# Patient Record
Sex: FEMALE | NOT HISPANIC OR LATINO | Employment: UNEMPLOYED | URBAN - METROPOLITAN AREA
[De-identification: names, ages, dates, MRNs, and addresses within clinical notes are randomized per-mention and may not be internally consistent; named-entity substitution may affect disease eponyms.]

---

## 2017-01-01 ENCOUNTER — COMMUNICATION - HEALTHEAST (OUTPATIENT)
Dept: FAMILY MEDICINE | Facility: CLINIC | Age: 57
End: 2017-01-01

## 2017-01-01 DIAGNOSIS — R73.09 OTHER ABNORMAL GLUCOSE: ICD-10-CM

## 2017-04-06 ENCOUNTER — COMMUNICATION - HEALTHEAST (OUTPATIENT)
Dept: FAMILY MEDICINE | Facility: CLINIC | Age: 57
End: 2017-04-06

## 2017-04-06 DIAGNOSIS — R73.09 OTHER ABNORMAL GLUCOSE: ICD-10-CM

## 2017-04-28 ENCOUNTER — COMMUNICATION - HEALTHEAST (OUTPATIENT)
Dept: FAMILY MEDICINE | Facility: CLINIC | Age: 57
End: 2017-04-28

## 2017-05-02 ENCOUNTER — COMMUNICATION - HEALTHEAST (OUTPATIENT)
Dept: FAMILY MEDICINE | Facility: CLINIC | Age: 57
End: 2017-05-02

## 2017-06-27 ENCOUNTER — COMMUNICATION - HEALTHEAST (OUTPATIENT)
Dept: FAMILY MEDICINE | Facility: CLINIC | Age: 57
End: 2017-06-27

## 2017-09-10 ENCOUNTER — COMMUNICATION - HEALTHEAST (OUTPATIENT)
Dept: FAMILY MEDICINE | Facility: CLINIC | Age: 57
End: 2017-09-10

## 2017-09-10 DIAGNOSIS — K21.9 GERD (GASTROESOPHAGEAL REFLUX DISEASE): ICD-10-CM

## 2017-09-17 ENCOUNTER — COMMUNICATION - HEALTHEAST (OUTPATIENT)
Dept: FAMILY MEDICINE | Facility: CLINIC | Age: 57
End: 2017-09-17

## 2017-10-04 ENCOUNTER — COMMUNICATION - HEALTHEAST (OUTPATIENT)
Dept: FAMILY MEDICINE | Facility: CLINIC | Age: 57
End: 2017-10-04

## 2017-10-04 DIAGNOSIS — R73.09 OTHER ABNORMAL GLUCOSE: ICD-10-CM

## 2017-11-20 ENCOUNTER — COMMUNICATION - HEALTHEAST (OUTPATIENT)
Dept: FAMILY MEDICINE | Facility: CLINIC | Age: 57
End: 2017-11-20

## 2017-11-20 DIAGNOSIS — R73.9 HYPERGLYCEMIA: ICD-10-CM

## 2017-11-21 ENCOUNTER — COMMUNICATION - HEALTHEAST (OUTPATIENT)
Dept: FAMILY MEDICINE | Facility: CLINIC | Age: 57
End: 2017-11-21

## 2017-11-22 ENCOUNTER — COMMUNICATION - HEALTHEAST (OUTPATIENT)
Dept: FAMILY MEDICINE | Facility: CLINIC | Age: 57
End: 2017-11-22

## 2017-11-22 DIAGNOSIS — E78.5 HLD (HYPERLIPIDEMIA): ICD-10-CM

## 2017-12-09 ENCOUNTER — COMMUNICATION - HEALTHEAST (OUTPATIENT)
Dept: FAMILY MEDICINE | Facility: CLINIC | Age: 57
End: 2017-12-09

## 2017-12-09 DIAGNOSIS — K21.9 GERD (GASTROESOPHAGEAL REFLUX DISEASE): ICD-10-CM

## 2017-12-13 ENCOUNTER — COMMUNICATION - HEALTHEAST (OUTPATIENT)
Dept: FAMILY MEDICINE | Facility: CLINIC | Age: 57
End: 2017-12-13

## 2017-12-13 DIAGNOSIS — E78.2 MIXED HYPERLIPIDEMIA: ICD-10-CM

## 2017-12-19 ENCOUNTER — OFFICE VISIT - HEALTHEAST (OUTPATIENT)
Dept: FAMILY MEDICINE | Facility: CLINIC | Age: 57
End: 2017-12-19

## 2017-12-19 DIAGNOSIS — Z12.31 ENCOUNTER FOR SCREENING MAMMOGRAM FOR BREAST CANCER: ICD-10-CM

## 2017-12-19 DIAGNOSIS — K21.9 ESOPHAGEAL REFLUX: ICD-10-CM

## 2017-12-19 DIAGNOSIS — E78.2 MIXED HYPERLIPIDEMIA: ICD-10-CM

## 2017-12-19 DIAGNOSIS — E55.9 VITAMIN D DEFICIENCY: ICD-10-CM

## 2017-12-19 DIAGNOSIS — E11.9 TYPE 2 DIABETES MELLITUS WITHOUT COMPLICATION (H): ICD-10-CM

## 2017-12-19 DIAGNOSIS — Z00.00 HEALTH CARE MAINTENANCE: ICD-10-CM

## 2017-12-19 DIAGNOSIS — R42 DIZZINESS AND GIDDINESS: ICD-10-CM

## 2017-12-19 LAB
CHOLEST SERPL-MCNC: 204 MG/DL
FASTING STATUS PATIENT QL REPORTED: YES
HDLC SERPL-MCNC: 48 MG/DL
LDLC SERPL CALC-MCNC: 133 MG/DL
TRIGL SERPL-MCNC: 113 MG/DL

## 2017-12-21 LAB
HUMAN PAPILLOMA VIRUS 16 DNA: NEGATIVE
HUMAN PAPILLOMA VIRUS 18 DNA: POSITIVE
HUMAN PAPILLOMA VIRUS FINAL DIAGNOSIS: ABNORMAL
HUMAN PAPILLOMA VIRUS OTHER HR: NEGATIVE
SPECIMEN DESCRIPTION: ABNORMAL

## 2018-01-02 LAB
BKR LAB AP ABNORMAL BLEEDING: NO
BKR LAB AP BIRTH CONTROL/HORMONES: ABNORMAL
BKR LAB AP CERVICAL APPEARANCE: NORMAL
BKR LAB AP GYN ADEQUACY: ABNORMAL
BKR LAB AP GYN INTERPRETATION: ABNORMAL
BKR LAB AP HPV REFLEX: ABNORMAL
BKR LAB AP LMP: ABNORMAL
BKR LAB AP PATIENT STATUS: ABNORMAL
BKR LAB AP PREVIOUS ABNORMAL: NO
BKR LAB AP PREVIOUS NORMAL: 2012
HIGH RISK?: NO
PATH REPORT.COMMENTS IMP SPEC: ABNORMAL
RESULT FLAG (HE HISTORICAL CONVERSION): ABNORMAL

## 2018-01-11 ENCOUNTER — RECORDS - HEALTHEAST (OUTPATIENT)
Dept: ADMINISTRATIVE | Facility: OTHER | Age: 58
End: 2018-01-11

## 2018-01-11 ENCOUNTER — HOSPITAL ENCOUNTER (OUTPATIENT)
Dept: MAMMOGRAPHY | Facility: CLINIC | Age: 58
Discharge: HOME | End: 2018-01-11
Attending: FAMILY MEDICINE

## 2018-01-11 ENCOUNTER — RECORDS - HEALTHEAST (OUTPATIENT)
Dept: BONE DENSITY | Facility: CLINIC | Age: 58
End: 2018-01-11

## 2018-01-11 DIAGNOSIS — E55.9 VITAMIN D DEFICIENCY, UNSPECIFIED: ICD-10-CM

## 2018-01-11 DIAGNOSIS — Z12.31 ENCOUNTER FOR SCREENING MAMMOGRAM FOR BREAST CANCER: ICD-10-CM

## 2018-02-19 ENCOUNTER — COMMUNICATION - HEALTHEAST (OUTPATIENT)
Dept: FAMILY MEDICINE | Facility: CLINIC | Age: 58
End: 2018-02-19

## 2018-02-19 DIAGNOSIS — E78.5 HLD (HYPERLIPIDEMIA): ICD-10-CM

## 2018-02-20 ENCOUNTER — AMBULATORY - HEALTHEAST (OUTPATIENT)
Dept: FAMILY MEDICINE | Facility: CLINIC | Age: 58
End: 2018-02-20

## 2018-02-20 DIAGNOSIS — R87.619 ABNORMAL PAP SMEAR OF CERVIX: ICD-10-CM

## 2018-02-20 DIAGNOSIS — E11.9 TYPE 2 DIABETES MELLITUS WITHOUT COMPLICATION (H): ICD-10-CM

## 2018-02-20 LAB
CREAT UR-MCNC: 141.3 MG/DL
HBA1C MFR BLD: 6.8 % (ref 3.5–6)
MICROALBUMIN UR-MCNC: 0.7 MG/DL (ref 0–1.99)
MICROALBUMIN/CREAT UR: 5 MG/G

## 2018-02-23 LAB
LAB AP CHARGES (HE HISTORICAL CONVERSION): NORMAL
PATH REPORT.COMMENTS IMP SPEC: NORMAL
PATH REPORT.COMMENTS IMP SPEC: NORMAL
PATH REPORT.FINAL DX SPEC: NORMAL
PATH REPORT.GROSS SPEC: NORMAL
PATH REPORT.MICROSCOPIC SPEC OTHER STN: NORMAL
PATH REPORT.RELEVANT HX SPEC: NORMAL
RESULT FLAG (HE HISTORICAL CONVERSION): NORMAL

## 2018-03-07 ENCOUNTER — COMMUNICATION - HEALTHEAST (OUTPATIENT)
Dept: FAMILY MEDICINE | Facility: CLINIC | Age: 58
End: 2018-03-07

## 2018-03-07 DIAGNOSIS — R73.09 OTHER ABNORMAL GLUCOSE: ICD-10-CM

## 2018-03-10 ENCOUNTER — COMMUNICATION - HEALTHEAST (OUTPATIENT)
Dept: FAMILY MEDICINE | Facility: CLINIC | Age: 58
End: 2018-03-10

## 2018-03-10 DIAGNOSIS — E78.2 MIXED HYPERLIPIDEMIA: ICD-10-CM

## 2018-03-10 DIAGNOSIS — K21.9 GERD (GASTROESOPHAGEAL REFLUX DISEASE): ICD-10-CM

## 2018-03-26 ENCOUNTER — COMMUNICATION - HEALTHEAST (OUTPATIENT)
Dept: FAMILY MEDICINE | Facility: CLINIC | Age: 58
End: 2018-03-26

## 2018-04-10 ENCOUNTER — COMMUNICATION - HEALTHEAST (OUTPATIENT)
Dept: FAMILY MEDICINE | Facility: CLINIC | Age: 58
End: 2018-04-10

## 2018-04-10 ENCOUNTER — AMBULATORY - HEALTHEAST (OUTPATIENT)
Dept: FAMILY MEDICINE | Facility: CLINIC | Age: 58
End: 2018-04-10

## 2018-05-22 ENCOUNTER — COMMUNICATION - HEALTHEAST (OUTPATIENT)
Dept: FAMILY MEDICINE | Facility: CLINIC | Age: 58
End: 2018-05-22

## 2018-05-22 DIAGNOSIS — K21.9 GERD (GASTROESOPHAGEAL REFLUX DISEASE): ICD-10-CM

## 2018-05-22 DIAGNOSIS — E78.2 MIXED HYPERLIPIDEMIA: ICD-10-CM

## 2018-05-28 ENCOUNTER — COMMUNICATION - HEALTHEAST (OUTPATIENT)
Dept: FAMILY MEDICINE | Facility: CLINIC | Age: 58
End: 2018-05-28

## 2018-05-28 ENCOUNTER — RECORDS - HEALTHEAST (OUTPATIENT)
Dept: ADMINISTRATIVE | Facility: OTHER | Age: 58
End: 2018-05-28

## 2018-06-08 ENCOUNTER — COMMUNICATION - HEALTHEAST (OUTPATIENT)
Dept: FAMILY MEDICINE | Facility: CLINIC | Age: 58
End: 2018-06-08

## 2018-10-02 ENCOUNTER — OFFICE VISIT - HEALTHEAST (OUTPATIENT)
Dept: FAMILY MEDICINE | Facility: CLINIC | Age: 58
End: 2018-10-02

## 2018-10-02 ENCOUNTER — RECORDS - HEALTHEAST (OUTPATIENT)
Dept: GENERAL RADIOLOGY | Facility: CLINIC | Age: 58
End: 2018-10-02

## 2018-10-02 ENCOUNTER — HOSPITAL ENCOUNTER (OUTPATIENT)
Dept: MRI IMAGING | Facility: CLINIC | Age: 58
Discharge: HOME | End: 2018-10-02
Attending: FAMILY MEDICINE

## 2018-10-02 DIAGNOSIS — M25.561 BILATERAL CHRONIC KNEE PAIN: ICD-10-CM

## 2018-10-02 DIAGNOSIS — M25.562 PAIN IN LEFT KNEE: ICD-10-CM

## 2018-10-02 DIAGNOSIS — Z23 NEED FOR VACCINATION: ICD-10-CM

## 2018-10-02 DIAGNOSIS — G89.29 BILATERAL CHRONIC KNEE PAIN: ICD-10-CM

## 2018-10-02 DIAGNOSIS — E11.9 TYPE 2 DIABETES MELLITUS WITHOUT COMPLICATION (H): ICD-10-CM

## 2018-10-02 DIAGNOSIS — M25.561 PAIN IN RIGHT KNEE: ICD-10-CM

## 2018-10-02 DIAGNOSIS — G89.29 OTHER CHRONIC PAIN: ICD-10-CM

## 2018-10-02 DIAGNOSIS — M25.562 BILATERAL CHRONIC KNEE PAIN: ICD-10-CM

## 2018-10-02 DIAGNOSIS — E78.2 MIXED HYPERLIPIDEMIA: ICD-10-CM

## 2018-10-02 LAB
ALBUMIN SERPL-MCNC: 3.8 G/DL (ref 3.5–5)
ALP SERPL-CCNC: 101 U/L (ref 45–120)
ALT SERPL W P-5'-P-CCNC: 24 U/L (ref 0–45)
ANION GAP SERPL CALCULATED.3IONS-SCNC: 10 MMOL/L (ref 5–18)
AST SERPL W P-5'-P-CCNC: 21 U/L (ref 0–40)
BILIRUB SERPL-MCNC: 0.5 MG/DL (ref 0–1)
BUN SERPL-MCNC: 19 MG/DL (ref 8–22)
CALCIUM SERPL-MCNC: 9.9 MG/DL (ref 8.5–10.5)
CHLORIDE BLD-SCNC: 104 MMOL/L (ref 98–107)
CHOLEST SERPL-MCNC: 229 MG/DL
CO2 SERPL-SCNC: 24 MMOL/L (ref 22–31)
CREAT SERPL-MCNC: 0.87 MG/DL (ref 0.6–1.1)
ERYTHROCYTE [DISTWIDTH] IN BLOOD BY AUTOMATED COUNT: 12.9 % (ref 11–14.5)
FASTING STATUS PATIENT QL REPORTED: NO
GFR SERPL CREATININE-BSD FRML MDRD: >60 ML/MIN/1.73M2
GLUCOSE BLD-MCNC: 225 MG/DL (ref 70–125)
HBA1C MFR BLD: 7.2 % (ref 3.5–6)
HCT VFR BLD AUTO: 43.7 % (ref 35–47)
HDLC SERPL-MCNC: 50 MG/DL
HGB BLD-MCNC: 15 G/DL (ref 12–16)
LDLC SERPL CALC-MCNC: 145 MG/DL
MCH RBC QN AUTO: 30.3 PG (ref 27–34)
MCHC RBC AUTO-ENTMCNC: 34.3 G/DL (ref 32–36)
MCV RBC AUTO: 89 FL (ref 80–100)
PLATELET # BLD AUTO: 310 THOU/UL (ref 140–440)
PMV BLD AUTO: 7.7 FL (ref 7–10)
POTASSIUM BLD-SCNC: 4.8 MMOL/L (ref 3.5–5)
PROT SERPL-MCNC: 6.9 G/DL (ref 6–8)
RBC # BLD AUTO: 4.94 MILL/UL (ref 3.8–5.4)
SODIUM SERPL-SCNC: 138 MMOL/L (ref 136–145)
TRIGL SERPL-MCNC: 169 MG/DL
WBC: 5.1 THOU/UL (ref 4–11)

## 2018-10-03 ENCOUNTER — AMBULATORY - HEALTHEAST (OUTPATIENT)
Dept: FAMILY MEDICINE | Facility: CLINIC | Age: 58
End: 2018-10-03

## 2018-10-04 ENCOUNTER — AMBULATORY - HEALTHEAST (OUTPATIENT)
Dept: FAMILY MEDICINE | Facility: CLINIC | Age: 58
End: 2018-10-04

## 2018-10-04 DIAGNOSIS — S83.281A TEAR OF LATERAL MENISCUS OF RIGHT KNEE, CURRENT, UNSPECIFIED TEAR TYPE, INITIAL ENCOUNTER: ICD-10-CM

## 2018-10-16 ENCOUNTER — RECORDS - HEALTHEAST (OUTPATIENT)
Dept: ADMINISTRATIVE | Facility: OTHER | Age: 58
End: 2018-10-16

## 2018-10-23 ENCOUNTER — OFFICE VISIT - HEALTHEAST (OUTPATIENT)
Dept: FAMILY MEDICINE | Facility: CLINIC | Age: 58
End: 2018-10-23

## 2018-10-23 DIAGNOSIS — E11.9 TYPE 2 DIABETES MELLITUS WITHOUT COMPLICATION, WITHOUT LONG-TERM CURRENT USE OF INSULIN (H): ICD-10-CM

## 2018-10-23 DIAGNOSIS — Z01.818 PRE-OP EXAM: ICD-10-CM

## 2018-10-23 DIAGNOSIS — S83.206D TEAR OF RIGHT MENISCUS AS CURRENT INJURY, SUBSEQUENT ENCOUNTER: ICD-10-CM

## 2018-10-23 LAB
ATRIAL RATE - MUSE: 73 BPM
DIASTOLIC BLOOD PRESSURE - MUSE: NORMAL MMHG
INTERPRETATION ECG - MUSE: NORMAL
P AXIS - MUSE: 71 DEGREES
PR INTERVAL - MUSE: 150 MS
QRS DURATION - MUSE: 86 MS
QT - MUSE: 392 MS
QTC - MUSE: 431 MS
R AXIS - MUSE: 45 DEGREES
SYSTOLIC BLOOD PRESSURE - MUSE: NORMAL MMHG
T AXIS - MUSE: 62 DEGREES
VENTRICULAR RATE- MUSE: 73 BPM

## 2018-10-23 ASSESSMENT — MIFFLIN-ST. JEOR: SCORE: 1532.71

## 2018-10-25 ENCOUNTER — RECORDS - HEALTHEAST (OUTPATIENT)
Dept: ADMINISTRATIVE | Facility: OTHER | Age: 58
End: 2018-10-25

## 2018-11-02 ENCOUNTER — RECORDS - HEALTHEAST (OUTPATIENT)
Dept: ADMINISTRATIVE | Facility: OTHER | Age: 58
End: 2018-11-02

## 2018-11-15 ENCOUNTER — COMMUNICATION - HEALTHEAST (OUTPATIENT)
Dept: FAMILY MEDICINE | Facility: CLINIC | Age: 58
End: 2018-11-15

## 2018-11-15 DIAGNOSIS — E78.5 HLD (HYPERLIPIDEMIA): ICD-10-CM

## 2018-11-15 DIAGNOSIS — F32.A DEPRESSION: ICD-10-CM

## 2018-12-16 ENCOUNTER — COMMUNICATION - HEALTHEAST (OUTPATIENT)
Dept: FAMILY MEDICINE | Facility: CLINIC | Age: 58
End: 2018-12-16

## 2018-12-16 DIAGNOSIS — K21.9 GERD (GASTROESOPHAGEAL REFLUX DISEASE): ICD-10-CM

## 2018-12-28 ENCOUNTER — COMMUNICATION - HEALTHEAST (OUTPATIENT)
Dept: FAMILY MEDICINE | Facility: CLINIC | Age: 58
End: 2018-12-28

## 2018-12-28 DIAGNOSIS — R73.9 HYPERGLYCEMIA: ICD-10-CM

## 2018-12-29 RX ORDER — GLUCOSAMINE HCL/CHONDROITIN SU 500-400 MG
CAPSULE ORAL
Qty: 200 STRIP | Refills: 2 | Status: SHIPPED | OUTPATIENT
Start: 2018-12-29

## 2019-01-10 ENCOUNTER — COMMUNICATION - HEALTHEAST (OUTPATIENT)
Dept: FAMILY MEDICINE | Facility: CLINIC | Age: 59
End: 2019-01-10

## 2019-01-10 DIAGNOSIS — E78.2 MIXED HYPERLIPIDEMIA: ICD-10-CM

## 2019-01-31 ENCOUNTER — COMMUNICATION - HEALTHEAST (OUTPATIENT)
Dept: FAMILY MEDICINE | Facility: CLINIC | Age: 59
End: 2019-01-31

## 2019-02-21 ENCOUNTER — AMBULATORY - HEALTHEAST (OUTPATIENT)
Dept: FAMILY MEDICINE | Facility: CLINIC | Age: 59
End: 2019-02-21

## 2019-02-21 DIAGNOSIS — R87.619 ABNORMAL PAP SMEAR OF CERVIX: ICD-10-CM

## 2019-02-21 DIAGNOSIS — E11.9 TYPE 2 DIABETES MELLITUS WITHOUT COMPLICATION, WITHOUT LONG-TERM CURRENT USE OF INSULIN (H): ICD-10-CM

## 2019-02-21 DIAGNOSIS — Z00.00 HEALTHCARE MAINTENANCE: ICD-10-CM

## 2019-02-21 LAB — HBA1C MFR BLD: 7.5 % (ref 3.5–6)

## 2019-02-21 ASSESSMENT — MIFFLIN-ST. JEOR: SCORE: 1560.61

## 2019-02-25 LAB
BKR LAB AP ABNORMAL BLEEDING: NO
BKR LAB AP BIRTH CONTROL/HORMONES: ABNORMAL
BKR LAB AP CERVICAL APPEARANCE: NORMAL
BKR LAB AP GYN ADEQUACY: ABNORMAL
BKR LAB AP GYN INTERPRETATION: ABNORMAL
BKR LAB AP GYN OTHER FINDINGS: ABNORMAL
BKR LAB AP HPV REFLEX: ABNORMAL
BKR LAB AP LMP: ABNORMAL
BKR LAB AP PATIENT STATUS: ABNORMAL
BKR LAB AP PREVIOUS ABNORMAL: ABNORMAL
BKR LAB AP PREVIOUS NORMAL: ABNORMAL
HIGH RISK?: YES
LAB AP CHARGES (HE HISTORICAL CONVERSION): NORMAL
PATH REPORT.COMMENTS IMP SPEC: ABNORMAL
PATH REPORT.COMMENTS IMP SPEC: NORMAL
PATH REPORT.COMMENTS IMP SPEC: NORMAL
PATH REPORT.FINAL DX SPEC: NORMAL
PATH REPORT.GROSS SPEC: NORMAL
PATH REPORT.MICROSCOPIC SPEC OTHER STN: NORMAL
PATH REPORT.RELEVANT HX SPEC: NORMAL
RESULT FLAG (HE HISTORICAL CONVERSION): ABNORMAL
RESULT FLAG (HE HISTORICAL CONVERSION): NORMAL

## 2019-02-26 LAB
HPV SOURCE: NORMAL
HUMAN PAPILLOMA VIRUS 16 DNA: NEGATIVE
HUMAN PAPILLOMA VIRUS 18 DNA: NEGATIVE
HUMAN PAPILLOMA VIRUS FINAL DIAGNOSIS: NORMAL
HUMAN PAPILLOMA VIRUS OTHER HR: NEGATIVE
SPECIMEN DESCRIPTION: NORMAL

## 2019-02-28 ENCOUNTER — COMMUNICATION - HEALTHEAST (OUTPATIENT)
Dept: FAMILY MEDICINE | Facility: CLINIC | Age: 59
End: 2019-02-28

## 2019-02-28 DIAGNOSIS — R87.612 LOW GRADE SQUAMOUS INTRAEPITHELIAL LESION ON CYTOLOGIC SMEAR OF CERVIX (LGSIL): ICD-10-CM

## 2019-03-05 ENCOUNTER — COMMUNICATION - HEALTHEAST (OUTPATIENT)
Dept: FAMILY MEDICINE | Facility: CLINIC | Age: 59
End: 2019-03-05

## 2019-04-04 ENCOUNTER — RECORDS - HEALTHEAST (OUTPATIENT)
Dept: ADMINISTRATIVE | Facility: OTHER | Age: 59
End: 2019-04-04

## 2019-04-08 ENCOUNTER — RECORDS - HEALTHEAST (OUTPATIENT)
Dept: ADMINISTRATIVE | Facility: OTHER | Age: 59
End: 2019-04-08

## 2019-04-17 ENCOUNTER — RECORDS - HEALTHEAST (OUTPATIENT)
Dept: ADMINISTRATIVE | Facility: OTHER | Age: 59
End: 2019-04-17

## 2019-06-04 ENCOUNTER — COMMUNICATION - HEALTHEAST (OUTPATIENT)
Dept: FAMILY MEDICINE | Facility: CLINIC | Age: 59
End: 2019-06-04

## 2019-06-18 ENCOUNTER — COMMUNICATION - HEALTHEAST (OUTPATIENT)
Dept: FAMILY MEDICINE | Facility: CLINIC | Age: 59
End: 2019-06-18

## 2019-06-18 DIAGNOSIS — E11.9 TYPE 2 DIABETES MELLITUS WITHOUT COMPLICATION, WITHOUT LONG-TERM CURRENT USE OF INSULIN (H): ICD-10-CM

## 2019-06-28 ENCOUNTER — COMMUNICATION - HEALTHEAST (OUTPATIENT)
Dept: FAMILY MEDICINE | Facility: CLINIC | Age: 59
End: 2019-06-28

## 2019-07-10 ASSESSMENT — MIFFLIN-ST. JEOR: SCORE: 1499.82

## 2019-07-11 ENCOUNTER — ANESTHESIA - HEALTHEAST (OUTPATIENT)
Dept: SURGERY | Facility: CLINIC | Age: 59
End: 2019-07-11

## 2019-07-12 ENCOUNTER — SURGERY - HEALTHEAST (OUTPATIENT)
Dept: SURGERY | Facility: CLINIC | Age: 59
End: 2019-07-12

## 2019-07-13 ENCOUNTER — HOME CARE/HOSPICE - HEALTHEAST (OUTPATIENT)
Dept: HOME HEALTH SERVICES | Facility: HOME HEALTH | Age: 59
End: 2019-07-13

## 2019-07-13 ASSESSMENT — MIFFLIN-ST. JEOR: SCORE: 1499.82

## 2019-07-15 ENCOUNTER — HOME CARE/HOSPICE - HEALTHEAST (OUTPATIENT)
Dept: HOME HEALTH SERVICES | Facility: HOME HEALTH | Age: 59
End: 2019-07-15

## 2019-07-16 ENCOUNTER — COMMUNICATION - HEALTHEAST (OUTPATIENT)
Dept: FAMILY MEDICINE | Facility: CLINIC | Age: 59
End: 2019-07-16

## 2019-07-16 ENCOUNTER — AMBULATORY - HEALTHEAST (OUTPATIENT)
Dept: FAMILY MEDICINE | Facility: CLINIC | Age: 59
End: 2019-07-16

## 2019-07-16 ENCOUNTER — COMMUNICATION - HEALTHEAST (OUTPATIENT)
Dept: HOME HEALTH SERVICES | Facility: HOME HEALTH | Age: 59
End: 2019-07-16

## 2019-07-16 ENCOUNTER — HOME CARE/HOSPICE - HEALTHEAST (OUTPATIENT)
Dept: HOME HEALTH SERVICES | Facility: HOME HEALTH | Age: 59
End: 2019-07-16

## 2019-07-16 ENCOUNTER — COMMUNICATION - HEALTHEAST (OUTPATIENT)
Dept: CARE COORDINATION | Facility: CLINIC | Age: 59
End: 2019-07-16

## 2019-07-17 ENCOUNTER — HOME CARE/HOSPICE - HEALTHEAST (OUTPATIENT)
Dept: HOME HEALTH SERVICES | Facility: HOME HEALTH | Age: 59
End: 2019-07-17

## 2019-07-18 ENCOUNTER — HOME CARE/HOSPICE - HEALTHEAST (OUTPATIENT)
Dept: HOME HEALTH SERVICES | Facility: HOME HEALTH | Age: 59
End: 2019-07-18

## 2019-07-19 ENCOUNTER — HOME CARE/HOSPICE - HEALTHEAST (OUTPATIENT)
Dept: HOME HEALTH SERVICES | Facility: HOME HEALTH | Age: 59
End: 2019-07-19

## 2019-07-20 ENCOUNTER — HOME CARE/HOSPICE - HEALTHEAST (OUTPATIENT)
Dept: HOME HEALTH SERVICES | Facility: HOME HEALTH | Age: 59
End: 2019-07-20

## 2019-07-22 ENCOUNTER — AMBULATORY - HEALTHEAST (OUTPATIENT)
Dept: FAMILY MEDICINE | Facility: CLINIC | Age: 59
End: 2019-07-22

## 2019-07-22 ENCOUNTER — HOME CARE/HOSPICE - HEALTHEAST (OUTPATIENT)
Dept: HOME HEALTH SERVICES | Facility: HOME HEALTH | Age: 59
End: 2019-07-22

## 2019-07-22 ENCOUNTER — COMMUNICATION - HEALTHEAST (OUTPATIENT)
Dept: FAMILY MEDICINE | Facility: CLINIC | Age: 59
End: 2019-07-22

## 2019-07-22 DIAGNOSIS — S82.121A CLOSED FRACTURE OF LATERAL PORTION OF RIGHT TIBIAL PLATEAU, INITIAL ENCOUNTER: ICD-10-CM

## 2019-07-24 ENCOUNTER — HOME CARE/HOSPICE - HEALTHEAST (OUTPATIENT)
Dept: HOME HEALTH SERVICES | Facility: HOME HEALTH | Age: 59
End: 2019-07-24

## 2019-07-25 ENCOUNTER — HOME CARE/HOSPICE - HEALTHEAST (OUTPATIENT)
Dept: HOME HEALTH SERVICES | Facility: HOME HEALTH | Age: 59
End: 2019-07-25

## 2019-07-26 ENCOUNTER — HOME CARE/HOSPICE - HEALTHEAST (OUTPATIENT)
Dept: HOME HEALTH SERVICES | Facility: HOME HEALTH | Age: 59
End: 2019-07-26

## 2019-07-26 ENCOUNTER — COMMUNICATION - HEALTHEAST (OUTPATIENT)
Dept: HOME HEALTH SERVICES | Facility: HOME HEALTH | Age: 59
End: 2019-07-26

## 2019-07-29 ENCOUNTER — HOME CARE/HOSPICE - HEALTHEAST (OUTPATIENT)
Dept: HOME HEALTH SERVICES | Facility: HOME HEALTH | Age: 59
End: 2019-07-29

## 2019-07-30 ENCOUNTER — COMMUNICATION - HEALTHEAST (OUTPATIENT)
Dept: HOME HEALTH SERVICES | Facility: HOME HEALTH | Age: 59
End: 2019-07-30

## 2019-07-31 ENCOUNTER — COMMUNICATION - HEALTHEAST (OUTPATIENT)
Dept: FAMILY MEDICINE | Facility: CLINIC | Age: 59
End: 2019-07-31

## 2019-07-31 ENCOUNTER — HOME CARE/HOSPICE - HEALTHEAST (OUTPATIENT)
Dept: HOME HEALTH SERVICES | Facility: HOME HEALTH | Age: 59
End: 2019-07-31

## 2019-08-02 ENCOUNTER — HOME CARE/HOSPICE - HEALTHEAST (OUTPATIENT)
Dept: HOME HEALTH SERVICES | Facility: HOME HEALTH | Age: 59
End: 2019-08-02

## 2019-08-04 ENCOUNTER — COMMUNICATION - HEALTHEAST (OUTPATIENT)
Dept: FAMILY MEDICINE | Facility: CLINIC | Age: 59
End: 2019-08-04

## 2019-08-05 ENCOUNTER — HOME CARE/HOSPICE - HEALTHEAST (OUTPATIENT)
Dept: HOME HEALTH SERVICES | Facility: HOME HEALTH | Age: 59
End: 2019-08-05

## 2019-08-06 ENCOUNTER — HOME CARE/HOSPICE - HEALTHEAST (OUTPATIENT)
Dept: HOME HEALTH SERVICES | Facility: HOME HEALTH | Age: 59
End: 2019-08-06

## 2019-08-08 ENCOUNTER — COMMUNICATION - HEALTHEAST (OUTPATIENT)
Dept: HOME HEALTH SERVICES | Facility: HOME HEALTH | Age: 59
End: 2019-08-08

## 2019-08-08 ENCOUNTER — COMMUNICATION - HEALTHEAST (OUTPATIENT)
Dept: FAMILY MEDICINE | Facility: CLINIC | Age: 59
End: 2019-08-08

## 2019-08-08 ENCOUNTER — HOME CARE/HOSPICE - HEALTHEAST (OUTPATIENT)
Dept: HOME HEALTH SERVICES | Facility: HOME HEALTH | Age: 59
End: 2019-08-08

## 2019-08-08 DIAGNOSIS — E11.9 TYPE 2 DIABETES MELLITUS WITHOUT COMPLICATION, WITHOUT LONG-TERM CURRENT USE OF INSULIN (H): ICD-10-CM

## 2019-08-09 ENCOUNTER — HOME CARE/HOSPICE - HEALTHEAST (OUTPATIENT)
Dept: HOME HEALTH SERVICES | Facility: HOME HEALTH | Age: 59
End: 2019-08-09

## 2019-08-13 ENCOUNTER — HOME CARE/HOSPICE - HEALTHEAST (OUTPATIENT)
Dept: HOME HEALTH SERVICES | Facility: HOME HEALTH | Age: 59
End: 2019-08-13

## 2019-08-16 ENCOUNTER — HOME CARE/HOSPICE - HEALTHEAST (OUTPATIENT)
Dept: HOME HEALTH SERVICES | Facility: HOME HEALTH | Age: 59
End: 2019-08-16

## 2019-08-17 ENCOUNTER — HOME CARE/HOSPICE - HEALTHEAST (OUTPATIENT)
Dept: HOME HEALTH SERVICES | Facility: HOME HEALTH | Age: 59
End: 2019-08-17

## 2019-08-22 ENCOUNTER — HOME CARE/HOSPICE - HEALTHEAST (OUTPATIENT)
Dept: HOME HEALTH SERVICES | Facility: HOME HEALTH | Age: 59
End: 2019-08-22

## 2019-08-22 ENCOUNTER — COMMUNICATION - HEALTHEAST (OUTPATIENT)
Dept: CARE COORDINATION | Facility: CLINIC | Age: 59
End: 2019-08-22

## 2019-08-23 ENCOUNTER — HOME CARE/HOSPICE - HEALTHEAST (OUTPATIENT)
Dept: HOME HEALTH SERVICES | Facility: HOME HEALTH | Age: 59
End: 2019-08-23

## 2019-08-23 ENCOUNTER — COMMUNICATION - HEALTHEAST (OUTPATIENT)
Dept: SCHEDULING | Facility: CLINIC | Age: 59
End: 2019-08-23

## 2019-08-26 ENCOUNTER — COMMUNICATION - HEALTHEAST (OUTPATIENT)
Dept: FAMILY MEDICINE | Facility: CLINIC | Age: 59
End: 2019-08-26

## 2019-08-26 ENCOUNTER — HOME CARE/HOSPICE - HEALTHEAST (OUTPATIENT)
Dept: HOME HEALTH SERVICES | Facility: HOME HEALTH | Age: 59
End: 2019-08-26

## 2019-08-27 ENCOUNTER — AMBULATORY - HEALTHEAST (OUTPATIENT)
Dept: PHARMACY | Facility: CLINIC | Age: 59
End: 2019-08-27

## 2019-08-27 DIAGNOSIS — S82.121A CLOSED FRACTURE OF LATERAL PORTION OF RIGHT TIBIAL PLATEAU, INITIAL ENCOUNTER: ICD-10-CM

## 2019-08-27 DIAGNOSIS — E11.9 TYPE 2 DIABETES MELLITUS TREATED WITHOUT INSULIN (H): ICD-10-CM

## 2019-08-27 DIAGNOSIS — K62.5 RECTAL BLEEDING: ICD-10-CM

## 2019-08-27 DIAGNOSIS — E78.2 MIXED HYPERLIPIDEMIA: ICD-10-CM

## 2019-08-27 DIAGNOSIS — H81.399 PERIPHERAL VERTIGO, UNSPECIFIED LATERALITY: ICD-10-CM

## 2019-08-27 DIAGNOSIS — K62.5 RECTAL BLEED: ICD-10-CM

## 2019-08-27 DIAGNOSIS — K21.9 GASTROESOPHAGEAL REFLUX DISEASE, ESOPHAGITIS PRESENCE NOT SPECIFIED: ICD-10-CM

## 2019-08-27 DIAGNOSIS — Z72.0 TOBACCO ABUSE: ICD-10-CM

## 2019-08-29 ENCOUNTER — HOME CARE/HOSPICE - HEALTHEAST (OUTPATIENT)
Dept: HOME HEALTH SERVICES | Facility: HOME HEALTH | Age: 59
End: 2019-08-29

## 2019-08-30 ENCOUNTER — COMMUNICATION - HEALTHEAST (OUTPATIENT)
Dept: HOME HEALTH SERVICES | Facility: HOME HEALTH | Age: 59
End: 2019-08-30

## 2019-09-04 ENCOUNTER — HOME CARE/HOSPICE - HEALTHEAST (OUTPATIENT)
Dept: HOME HEALTH SERVICES | Facility: HOME HEALTH | Age: 59
End: 2019-09-04

## 2019-09-06 ENCOUNTER — HOME CARE/HOSPICE - HEALTHEAST (OUTPATIENT)
Dept: HOME HEALTH SERVICES | Facility: HOME HEALTH | Age: 59
End: 2019-09-06

## 2019-09-09 ENCOUNTER — HOME CARE/HOSPICE - HEALTHEAST (OUTPATIENT)
Dept: HOME HEALTH SERVICES | Facility: HOME HEALTH | Age: 59
End: 2019-09-09

## 2019-09-10 ENCOUNTER — OFFICE VISIT - HEALTHEAST (OUTPATIENT)
Dept: FAMILY MEDICINE | Facility: CLINIC | Age: 59
End: 2019-09-10

## 2019-09-10 DIAGNOSIS — E78.2 MIXED HYPERLIPIDEMIA: ICD-10-CM

## 2019-09-10 DIAGNOSIS — E11.9 TYPE 2 DIABETES MELLITUS WITHOUT COMPLICATION, WITHOUT LONG-TERM CURRENT USE OF INSULIN (H): ICD-10-CM

## 2019-09-10 DIAGNOSIS — R20.9 COLD RIGHT FOOT: ICD-10-CM

## 2019-09-10 DIAGNOSIS — Z00.00 ROUTINE GENERAL MEDICAL EXAMINATION AT A HEALTH CARE FACILITY: ICD-10-CM

## 2019-09-10 DIAGNOSIS — H81.399 PERIPHERAL VERTIGO, UNSPECIFIED LATERALITY: ICD-10-CM

## 2019-09-10 DIAGNOSIS — K21.9 GASTROESOPHAGEAL REFLUX DISEASE WITHOUT ESOPHAGITIS: ICD-10-CM

## 2019-09-10 DIAGNOSIS — I10 ESSENTIAL HYPERTENSION, BENIGN: ICD-10-CM

## 2019-09-10 DIAGNOSIS — Z23 NEED FOR INFLUENZA VACCINATION: ICD-10-CM

## 2019-09-10 DIAGNOSIS — R87.619 ABNORMAL CERVICAL PAPANICOLAOU SMEAR, UNSPECIFIED ABNORMAL PAP FINDING: ICD-10-CM

## 2019-09-10 LAB
CHOLEST SERPL-MCNC: 230 MG/DL
FASTING STATUS PATIENT QL REPORTED: ABNORMAL
HBA1C MFR BLD: 7.8 % (ref 3.5–6)
HDLC SERPL-MCNC: 45 MG/DL
LDLC SERPL CALC-MCNC: 152 MG/DL
TRIGL SERPL-MCNC: 166 MG/DL

## 2019-09-10 ASSESSMENT — PATIENT HEALTH QUESTIONNAIRE - PHQ9: SUM OF ALL RESPONSES TO PHQ QUESTIONS 1-9: 2

## 2019-09-10 ASSESSMENT — MIFFLIN-ST. JEOR: SCORE: 1430.42

## 2019-09-12 ENCOUNTER — HOME CARE/HOSPICE - HEALTHEAST (OUTPATIENT)
Dept: HOME HEALTH SERVICES | Facility: HOME HEALTH | Age: 59
End: 2019-09-12

## 2019-09-12 ENCOUNTER — HOSPITAL ENCOUNTER (OUTPATIENT)
Dept: ULTRASOUND IMAGING | Facility: CLINIC | Age: 59
Discharge: HOME | End: 2019-09-12
Attending: FAMILY MEDICINE

## 2019-09-12 ENCOUNTER — COMMUNICATION - HEALTHEAST (OUTPATIENT)
Dept: HOME HEALTH SERVICES | Facility: HOME HEALTH | Age: 59
End: 2019-09-12

## 2019-09-12 DIAGNOSIS — R20.9 COLD RIGHT FOOT: ICD-10-CM

## 2019-09-16 ENCOUNTER — HOME CARE/HOSPICE - HEALTHEAST (OUTPATIENT)
Dept: HOME HEALTH SERVICES | Facility: HOME HEALTH | Age: 59
End: 2019-09-16

## 2019-09-16 ENCOUNTER — COMMUNICATION - HEALTHEAST (OUTPATIENT)
Dept: FAMILY MEDICINE | Facility: CLINIC | Age: 59
End: 2019-09-16

## 2019-09-17 ENCOUNTER — HOME CARE/HOSPICE - HEALTHEAST (OUTPATIENT)
Dept: HOME HEALTH SERVICES | Facility: HOME HEALTH | Age: 59
End: 2019-09-17

## 2019-09-18 ENCOUNTER — RECORDS - HEALTHEAST (OUTPATIENT)
Dept: ADMINISTRATIVE | Facility: OTHER | Age: 59
End: 2019-09-18

## 2019-09-23 ENCOUNTER — HOME CARE/HOSPICE - HEALTHEAST (OUTPATIENT)
Dept: HOME HEALTH SERVICES | Facility: HOME HEALTH | Age: 59
End: 2019-09-23

## 2019-10-01 ENCOUNTER — HOME CARE/HOSPICE - HEALTHEAST (OUTPATIENT)
Dept: HOME HEALTH SERVICES | Facility: HOME HEALTH | Age: 59
End: 2019-10-01

## 2019-10-08 ENCOUNTER — HOME CARE/HOSPICE - HEALTHEAST (OUTPATIENT)
Dept: HOME HEALTH SERVICES | Facility: HOME HEALTH | Age: 59
End: 2019-10-08

## 2019-10-12 ENCOUNTER — COMMUNICATION - HEALTHEAST (OUTPATIENT)
Dept: FAMILY MEDICINE | Facility: CLINIC | Age: 59
End: 2019-10-12

## 2019-10-12 DIAGNOSIS — E78.2 MIXED HYPERLIPIDEMIA: ICD-10-CM

## 2019-10-14 ENCOUNTER — HOME CARE/HOSPICE - HEALTHEAST (OUTPATIENT)
Dept: HOME HEALTH SERVICES | Facility: HOME HEALTH | Age: 59
End: 2019-10-14

## 2019-10-14 ENCOUNTER — COMMUNICATION - HEALTHEAST (OUTPATIENT)
Dept: HOME HEALTH SERVICES | Facility: HOME HEALTH | Age: 59
End: 2019-10-14

## 2019-10-31 ENCOUNTER — COMMUNICATION - HEALTHEAST (OUTPATIENT)
Dept: FAMILY MEDICINE | Facility: CLINIC | Age: 59
End: 2019-10-31

## 2019-10-31 DIAGNOSIS — E11.9 TYPE 2 DIABETES MELLITUS WITHOUT COMPLICATION, WITHOUT LONG-TERM CURRENT USE OF INSULIN (H): ICD-10-CM

## 2019-11-07 ENCOUNTER — COMMUNICATION - HEALTHEAST (OUTPATIENT)
Dept: FAMILY MEDICINE | Facility: CLINIC | Age: 59
End: 2019-11-07

## 2019-11-07 DIAGNOSIS — K21.9 GASTROESOPHAGEAL REFLUX DISEASE WITHOUT ESOPHAGITIS: ICD-10-CM

## 2019-11-07 RX ORDER — PANTOPRAZOLE SODIUM 20 MG/1
20 TABLET, DELAYED RELEASE ORAL DAILY
Qty: 90 TABLET | Refills: 3 | Status: SHIPPED | OUTPATIENT
Start: 2019-11-07

## 2019-11-18 ENCOUNTER — COMMUNICATION - HEALTHEAST (OUTPATIENT)
Dept: FAMILY MEDICINE | Facility: CLINIC | Age: 59
End: 2019-11-18

## 2019-11-18 DIAGNOSIS — F32.A DEPRESSION: ICD-10-CM

## 2019-11-25 ENCOUNTER — COMMUNICATION - HEALTHEAST (OUTPATIENT)
Dept: FAMILY MEDICINE | Facility: CLINIC | Age: 59
End: 2019-11-25

## 2019-11-25 DIAGNOSIS — E78.5 HLD (HYPERLIPIDEMIA): ICD-10-CM

## 2019-12-17 ENCOUNTER — OFFICE VISIT - HEALTHEAST (OUTPATIENT)
Dept: FAMILY MEDICINE | Facility: CLINIC | Age: 59
End: 2019-12-17

## 2019-12-17 ENCOUNTER — COMMUNICATION - HEALTHEAST (OUTPATIENT)
Dept: FAMILY MEDICINE | Facility: CLINIC | Age: 59
End: 2019-12-17

## 2019-12-17 DIAGNOSIS — E78.5 DYSLIPIDEMIA: ICD-10-CM

## 2019-12-17 DIAGNOSIS — E11.9 TYPE 2 DIABETES MELLITUS WITHOUT COMPLICATION, WITHOUT LONG-TERM CURRENT USE OF INSULIN (H): ICD-10-CM

## 2019-12-17 DIAGNOSIS — Z12.31 VISIT FOR SCREENING MAMMOGRAM: ICD-10-CM

## 2019-12-17 DIAGNOSIS — R87.619 ABNORMAL CERVICAL PAPANICOLAOU SMEAR, UNSPECIFIED ABNORMAL PAP FINDING: ICD-10-CM

## 2019-12-17 DIAGNOSIS — R06.02 SHORTNESS OF BREATH: ICD-10-CM

## 2019-12-17 LAB
CHOLEST SERPL-MCNC: 221 MG/DL
FASTING STATUS PATIENT QL REPORTED: YES
HBA1C MFR BLD: 7.4 % (ref 3.5–6)
HDLC SERPL-MCNC: 46 MG/DL
LDLC SERPL CALC-MCNC: 140 MG/DL
TRIGL SERPL-MCNC: 175 MG/DL

## 2019-12-18 LAB
HPV SOURCE: ABNORMAL
HUMAN PAPILLOMA VIRUS 16 DNA: NEGATIVE
HUMAN PAPILLOMA VIRUS 18 DNA: POSITIVE
HUMAN PAPILLOMA VIRUS FINAL DIAGNOSIS: ABNORMAL
HUMAN PAPILLOMA VIRUS OTHER HR: NEGATIVE
SPECIMEN DESCRIPTION: ABNORMAL

## 2019-12-26 ENCOUNTER — COMMUNICATION - HEALTHEAST (OUTPATIENT)
Dept: FAMILY MEDICINE | Facility: CLINIC | Age: 59
End: 2019-12-26

## 2019-12-27 ENCOUNTER — OFFICE VISIT - HEALTHEAST (OUTPATIENT)
Dept: FAMILY MEDICINE | Facility: CLINIC | Age: 59
End: 2019-12-27

## 2019-12-27 DIAGNOSIS — R19.7 DIARRHEA, UNSPECIFIED TYPE: ICD-10-CM

## 2019-12-27 DIAGNOSIS — E11.9 TYPE 2 DIABETES MELLITUS WITHOUT COMPLICATION, WITHOUT LONG-TERM CURRENT USE OF INSULIN (H): ICD-10-CM

## 2019-12-27 DIAGNOSIS — K52.9 COLITIS: ICD-10-CM

## 2019-12-27 LAB
ANION GAP SERPL CALCULATED.3IONS-SCNC: 11 MMOL/L (ref 5–18)
BUN SERPL-MCNC: 13 MG/DL (ref 8–22)
CALCIUM SERPL-MCNC: 10.2 MG/DL (ref 8.5–10.5)
CHLORIDE BLD-SCNC: 102 MMOL/L (ref 98–107)
CO2 SERPL-SCNC: 25 MMOL/L (ref 22–31)
CREAT SERPL-MCNC: 0.9 MG/DL (ref 0.6–1.1)
ERYTHROCYTE [DISTWIDTH] IN BLOOD BY AUTOMATED COUNT: 12.7 % (ref 11–14.5)
GFR SERPL CREATININE-BSD FRML MDRD: >60 ML/MIN/1.73M2
GLUCOSE BLD-MCNC: 135 MG/DL (ref 70–125)
HCT VFR BLD AUTO: 45.4 % (ref 35–47)
HGB BLD-MCNC: 15.2 G/DL (ref 12–16)
MCH RBC QN AUTO: 29.2 PG (ref 27–34)
MCHC RBC AUTO-ENTMCNC: 33.6 G/DL (ref 32–36)
MCV RBC AUTO: 87 FL (ref 80–100)
PLATELET # BLD AUTO: 316 THOU/UL (ref 140–440)
PMV BLD AUTO: 7.2 FL (ref 7–10)
POTASSIUM BLD-SCNC: 4.4 MMOL/L (ref 3.5–5)
RBC # BLD AUTO: 5.22 MILL/UL (ref 3.8–5.4)
SODIUM SERPL-SCNC: 138 MMOL/L (ref 136–145)
WBC: 5.9 THOU/UL (ref 4–11)

## 2019-12-30 LAB
BKR LAB AP ABNORMAL BLEEDING: NO
BKR LAB AP BIRTH CONTROL/HORMONES: ABNORMAL
BKR LAB AP CERVICAL APPEARANCE: NORMAL
BKR LAB AP GYN ADEQUACY: ABNORMAL
BKR LAB AP GYN INTERPRETATION: ABNORMAL
BKR LAB AP HPV REFLEX: ABNORMAL
BKR LAB AP LMP: ABNORMAL
BKR LAB AP PATIENT STATUS: ABNORMAL
BKR LAB AP PREVIOUS ABNORMAL: ABNORMAL
BKR LAB AP PREVIOUS NORMAL: 2015
HIGH RISK?: NO
PATH REPORT.COMMENTS IMP SPEC: ABNORMAL
RESULT FLAG (HE HISTORICAL CONVERSION): ABNORMAL

## 2020-01-23 ENCOUNTER — OFFICE VISIT - HEALTHEAST (OUTPATIENT)
Dept: FAMILY MEDICINE | Facility: CLINIC | Age: 60
End: 2020-01-23

## 2020-01-23 DIAGNOSIS — E11.9 TYPE 2 DIABETES MELLITUS WITHOUT COMPLICATION, WITHOUT LONG-TERM CURRENT USE OF INSULIN (H): ICD-10-CM

## 2020-02-04 ENCOUNTER — AMBULATORY - HEALTHEAST (OUTPATIENT)
Dept: EDUCATION SERVICES | Facility: CLINIC | Age: 60
End: 2020-02-04

## 2020-02-04 DIAGNOSIS — E11.9 TYPE 2 DIABETES MELLITUS TREATED WITHOUT INSULIN (H): ICD-10-CM

## 2020-02-27 ENCOUNTER — COMMUNICATION - HEALTHEAST (OUTPATIENT)
Dept: FAMILY MEDICINE | Facility: CLINIC | Age: 60
End: 2020-02-27

## 2020-02-27 DIAGNOSIS — E11.9 TYPE 2 DIABETES MELLITUS WITHOUT COMPLICATION, WITHOUT LONG-TERM CURRENT USE OF INSULIN (H): ICD-10-CM

## 2020-03-25 ENCOUNTER — COMMUNICATION - HEALTHEAST (OUTPATIENT)
Dept: FAMILY MEDICINE | Facility: CLINIC | Age: 60
End: 2020-03-25

## 2020-03-25 DIAGNOSIS — E11.9 TYPE 2 DIABETES MELLITUS WITHOUT COMPLICATION, WITHOUT LONG-TERM CURRENT USE OF INSULIN (H): ICD-10-CM

## 2020-06-20 ENCOUNTER — COMMUNICATION - HEALTHEAST (OUTPATIENT)
Dept: FAMILY MEDICINE | Facility: CLINIC | Age: 60
End: 2020-06-20

## 2020-06-20 DIAGNOSIS — E11.9 TYPE 2 DIABETES MELLITUS WITHOUT COMPLICATION, WITHOUT LONG-TERM CURRENT USE OF INSULIN (H): ICD-10-CM

## 2020-06-22 ENCOUNTER — OFFICE VISIT - HEALTHEAST (OUTPATIENT)
Dept: FAMILY MEDICINE | Facility: CLINIC | Age: 60
End: 2020-06-22

## 2020-06-22 DIAGNOSIS — E11.9 TYPE 2 DIABETES MELLITUS TREATED WITHOUT INSULIN (H): ICD-10-CM

## 2020-06-22 DIAGNOSIS — R53.83 OTHER FATIGUE: ICD-10-CM

## 2020-06-22 DIAGNOSIS — E78.5 DYSLIPIDEMIA: ICD-10-CM

## 2020-06-22 ASSESSMENT — PATIENT HEALTH QUESTIONNAIRE - PHQ9: SUM OF ALL RESPONSES TO PHQ QUESTIONS 1-9: 3

## 2020-07-20 ENCOUNTER — AMBULATORY - HEALTHEAST (OUTPATIENT)
Dept: LAB | Facility: CLINIC | Age: 60
End: 2020-07-20

## 2020-07-20 DIAGNOSIS — E11.9 TYPE 2 DIABETES MELLITUS TREATED WITHOUT INSULIN (H): ICD-10-CM

## 2020-07-20 DIAGNOSIS — E78.5 DYSLIPIDEMIA: ICD-10-CM

## 2020-07-20 DIAGNOSIS — R53.83 OTHER FATIGUE: ICD-10-CM

## 2020-07-20 LAB
ALBUMIN SERPL-MCNC: 3.8 G/DL (ref 3.5–5)
ALP SERPL-CCNC: 104 U/L (ref 45–120)
ALT SERPL W P-5'-P-CCNC: 33 U/L (ref 0–45)
ANION GAP SERPL CALCULATED.3IONS-SCNC: 13 MMOL/L (ref 5–18)
AST SERPL W P-5'-P-CCNC: 26 U/L (ref 0–40)
BILIRUB SERPL-MCNC: 0.4 MG/DL (ref 0–1)
BUN SERPL-MCNC: 16 MG/DL (ref 8–22)
CALCIUM SERPL-MCNC: 10 MG/DL (ref 8.5–10.5)
CHLORIDE BLD-SCNC: 103 MMOL/L (ref 98–107)
CHOLEST SERPL-MCNC: 234 MG/DL
CO2 SERPL-SCNC: 22 MMOL/L (ref 22–31)
CREAT SERPL-MCNC: 0.79 MG/DL (ref 0.6–1.1)
ERYTHROCYTE [DISTWIDTH] IN BLOOD BY AUTOMATED COUNT: 12.5 % (ref 11–14.5)
FASTING STATUS PATIENT QL REPORTED: YES
GFR SERPL CREATININE-BSD FRML MDRD: >60 ML/MIN/1.73M2
GLUCOSE BLD-MCNC: 140 MG/DL (ref 70–125)
HBA1C MFR BLD: 6.7 % (ref 3.5–6)
HCT VFR BLD AUTO: 45.9 % (ref 35–47)
HDLC SERPL-MCNC: 47 MG/DL
HGB BLD-MCNC: 15.4 G/DL (ref 12–16)
LDLC SERPL CALC-MCNC: 151 MG/DL
MCH RBC QN AUTO: 30 PG (ref 27–34)
MCHC RBC AUTO-ENTMCNC: 33.4 G/DL (ref 32–36)
MCV RBC AUTO: 90 FL (ref 80–100)
PLATELET # BLD AUTO: 235 THOU/UL (ref 140–440)
PMV BLD AUTO: 8.3 FL (ref 7–10)
POTASSIUM BLD-SCNC: 4.8 MMOL/L (ref 3.5–5)
PROT SERPL-MCNC: 6.8 G/DL (ref 6–8)
RBC # BLD AUTO: 5.12 MILL/UL (ref 3.8–5.4)
SODIUM SERPL-SCNC: 138 MMOL/L (ref 136–145)
TRIGL SERPL-MCNC: 181 MG/DL
TSH SERPL DL<=0.005 MIU/L-ACNC: 1.79 UIU/ML (ref 0.3–5)
VIT B12 SERPL-MCNC: 418 PG/ML (ref 213–816)
WBC: 6.2 THOU/UL (ref 4–11)

## 2020-07-21 LAB
25(OH)D3 SERPL-MCNC: 27.8 NG/ML (ref 30–80)
25(OH)D3 SERPL-MCNC: 27.8 NG/ML (ref 30–80)

## 2020-08-03 ENCOUNTER — COMMUNICATION - HEALTHEAST (OUTPATIENT)
Dept: FAMILY MEDICINE | Facility: CLINIC | Age: 60
End: 2020-08-03

## 2020-08-07 ENCOUNTER — COMMUNICATION - HEALTHEAST (OUTPATIENT)
Dept: FAMILY MEDICINE | Facility: CLINIC | Age: 60
End: 2020-08-07

## 2020-08-07 DIAGNOSIS — E11.9 TYPE 2 DIABETES MELLITUS WITHOUT COMPLICATION, WITHOUT LONG-TERM CURRENT USE OF INSULIN (H): ICD-10-CM

## 2020-08-14 ENCOUNTER — COMMUNICATION - HEALTHEAST (OUTPATIENT)
Dept: FAMILY MEDICINE | Facility: CLINIC | Age: 60
End: 2020-08-14

## 2020-08-14 DIAGNOSIS — F32.A DEPRESSION: ICD-10-CM

## 2020-08-14 DIAGNOSIS — E11.9 TYPE 2 DIABETES MELLITUS WITHOUT COMPLICATION, WITHOUT LONG-TERM CURRENT USE OF INSULIN (H): ICD-10-CM

## 2020-08-14 DIAGNOSIS — E78.2 MIXED HYPERLIPIDEMIA: ICD-10-CM

## 2020-08-14 RX ORDER — VENLAFAXINE HYDROCHLORIDE 150 MG/1
CAPSULE, EXTENDED RELEASE ORAL
Qty: 90 CAPSULE | Refills: 0 | Status: SHIPPED | OUTPATIENT
Start: 2020-08-14

## 2020-08-14 RX ORDER — EZETIMIBE 10 MG/1
TABLET ORAL
Qty: 90 TABLET | Refills: 0 | Status: SHIPPED | OUTPATIENT
Start: 2020-08-14

## 2020-11-09 ENCOUNTER — OFFICE VISIT - HEALTHEAST (OUTPATIENT)
Dept: FAMILY MEDICINE | Facility: CLINIC | Age: 60
End: 2020-11-09

## 2020-11-09 DIAGNOSIS — H81.399 PERIPHERAL VERTIGO, UNSPECIFIED LATERALITY: ICD-10-CM

## 2020-11-09 DIAGNOSIS — Z12.4 ENCOUNTER FOR SCREENING FOR MALIGNANT NEOPLASM OF CERVIX: ICD-10-CM

## 2020-11-09 DIAGNOSIS — K21.9 GASTROESOPHAGEAL REFLUX DISEASE WITHOUT ESOPHAGITIS: ICD-10-CM

## 2020-11-09 DIAGNOSIS — R87.619 ABNORMAL CERVICAL PAPANICOLAOU SMEAR, UNSPECIFIED ABNORMAL PAP FINDING: ICD-10-CM

## 2020-11-09 DIAGNOSIS — E78.2 MIXED HYPERLIPIDEMIA: ICD-10-CM

## 2020-11-09 DIAGNOSIS — I10 ESSENTIAL HYPERTENSION, BENIGN: ICD-10-CM

## 2020-11-09 DIAGNOSIS — Z00.00 ROUTINE GENERAL MEDICAL EXAMINATION AT A HEALTH CARE FACILITY: ICD-10-CM

## 2020-11-09 DIAGNOSIS — E11.9 TYPE 2 DIABETES MELLITUS TREATED WITHOUT INSULIN (H): ICD-10-CM

## 2020-11-09 ASSESSMENT — MIFFLIN-ST. JEOR: SCORE: 1491.39

## 2020-11-12 LAB
BKR LAB AP ABNORMAL BLEEDING: NO
BKR LAB AP BIRTH CONTROL/HORMONES: NORMAL
BKR LAB AP CERVICAL APPEARANCE: NORMAL
BKR LAB AP GYN ADEQUACY: NORMAL
BKR LAB AP GYN INTERPRETATION: NORMAL
BKR LAB AP HPV REFLEX: NO
BKR LAB AP LMP: NORMAL
BKR LAB AP PATIENT STATUS: NORMAL
BKR LAB AP PREVIOUS ABNORMAL: NORMAL
BKR LAB AP PREVIOUS NORMAL: NORMAL
HIGH RISK?: NO
PATH REPORT.COMMENTS IMP SPEC: NORMAL
RESULT FLAG (HE HISTORICAL CONVERSION): NORMAL

## 2020-11-19 ENCOUNTER — COMMUNICATION - HEALTHEAST (OUTPATIENT)
Dept: FAMILY MEDICINE | Facility: CLINIC | Age: 60
End: 2020-11-19

## 2020-11-19 DIAGNOSIS — R87.610 ASCUS WITH POSITIVE HIGH RISK HPV CERVICAL: ICD-10-CM

## 2020-11-19 DIAGNOSIS — R87.810 ASCUS WITH POSITIVE HIGH RISK HPV CERVICAL: ICD-10-CM

## 2020-11-20 ENCOUNTER — COMMUNICATION - HEALTHEAST (OUTPATIENT)
Dept: FAMILY MEDICINE | Facility: CLINIC | Age: 60
End: 2020-11-20

## 2020-11-20 DIAGNOSIS — E78.5 HLD (HYPERLIPIDEMIA): ICD-10-CM

## 2020-11-22 RX ORDER — ROSUVASTATIN CALCIUM 40 MG/1
TABLET, COATED ORAL
Qty: 90 TABLET | Refills: 3 | Status: SHIPPED | OUTPATIENT
Start: 2020-11-22

## 2020-12-08 ENCOUNTER — COMMUNICATION - HEALTHEAST (OUTPATIENT)
Dept: FAMILY MEDICINE | Facility: CLINIC | Age: 60
End: 2020-12-08

## 2020-12-11 ENCOUNTER — COMMUNICATION - HEALTHEAST (OUTPATIENT)
Dept: FAMILY MEDICINE | Facility: CLINIC | Age: 60
End: 2020-12-11

## 2020-12-11 DIAGNOSIS — E11.9 TYPE 2 DIABETES MELLITUS WITHOUT COMPLICATION, WITHOUT LONG-TERM CURRENT USE OF INSULIN (H): ICD-10-CM

## 2020-12-12 RX ORDER — GLIPIZIDE 10 MG/1
TABLET, FILM COATED, EXTENDED RELEASE ORAL
Qty: 90 TABLET | Refills: 0 | Status: SHIPPED | OUTPATIENT
Start: 2020-12-12

## 2021-01-05 ENCOUNTER — COMMUNICATION - HEALTHEAST (OUTPATIENT)
Dept: FAMILY MEDICINE | Facility: CLINIC | Age: 61
End: 2021-01-05

## 2021-01-05 DIAGNOSIS — E11.9 TYPE 2 DIABETES MELLITUS WITHOUT COMPLICATION, WITHOUT LONG-TERM CURRENT USE OF INSULIN (H): ICD-10-CM

## 2021-01-06 RX ORDER — DULAGLUTIDE 0.75 MG/.5ML
INJECTION, SOLUTION SUBCUTANEOUS
Qty: 2 ML | Refills: 2 | Status: SHIPPED | OUTPATIENT
Start: 2021-01-06

## 2021-04-26 ENCOUNTER — COMMUNICATION - HEALTHEAST (OUTPATIENT)
Dept: OBGYN | Facility: CLINIC | Age: 61
End: 2021-04-26

## 2021-04-26 DIAGNOSIS — R87.810 ASCUS WITH POSITIVE HIGH RISK HPV CERVICAL: ICD-10-CM

## 2021-04-26 DIAGNOSIS — R87.610 ASCUS WITH POSITIVE HIGH RISK HPV CERVICAL: ICD-10-CM

## 2021-05-26 VITALS
OXYGEN SATURATION: 98 % | TEMPERATURE: 97.9 F | HEART RATE: 89 BPM | DIASTOLIC BLOOD PRESSURE: 78 MMHG | SYSTOLIC BLOOD PRESSURE: 122 MMHG

## 2021-05-26 VITALS — DIASTOLIC BLOOD PRESSURE: 80 MMHG | TEMPERATURE: 98.1 F | HEART RATE: 89 BPM | SYSTOLIC BLOOD PRESSURE: 115 MMHG

## 2021-05-26 VITALS — SYSTOLIC BLOOD PRESSURE: 127 MMHG | DIASTOLIC BLOOD PRESSURE: 79 MMHG

## 2021-05-26 VITALS — TEMPERATURE: 98.2 F | SYSTOLIC BLOOD PRESSURE: 104 MMHG | HEART RATE: 81 BPM | DIASTOLIC BLOOD PRESSURE: 75 MMHG

## 2021-05-26 ASSESSMENT — PATIENT HEALTH QUESTIONNAIRE - PHQ9: SUM OF ALL RESPONSES TO PHQ QUESTIONS 1-9: 2

## 2021-05-27 ASSESSMENT — PATIENT HEALTH QUESTIONNAIRE - PHQ9: SUM OF ALL RESPONSES TO PHQ QUESTIONS 1-9: 3

## 2021-05-29 NOTE — TELEPHONE ENCOUNTER
Pt. Came into the clinic today and dropped off FMLA forms that need to be filled out.  When complete she will  at the  if we could call her when it's done.  Forms placed in Dr. Maria's bin.    Thanks.

## 2021-05-29 NOTE — TELEPHONE ENCOUNTER
RN cannot approve Refill Request    RN can NOT refill this medication overdue for office visits and/or labs.    Lenin ePña, [facility] Triage/Med Refill 6/18/2019    Requested Prescriptions   Pending Prescriptions Disp Refills     glipiZIDE (GLUCOTROL XL) 10 MG 24 hr tablet [Pharmacy Med Name: GLIPIZIDE ER 10 MG TABLET] 30 tablet 0     Sig: TAKE 1 TABLET BY MOUTH EVERY DAY       Oral Hypoglycemics Refill Protocol Failed - 6/18/2019  1:13 AM        Failed - Visit with PCP or prescribing provider visit in last 6 months       Last office visit with prescriber/PCP: Visit date not found OR same dept: 10/2/2018 Nivia Maria MD OR same specialty: 10/2/2018 Nivia Maria MD Last physical: Visit date not found Last MTM visit: Visit date not found         Next appt within 3 mo: Visit date not found  Next physical within 3 mo: Visit date not found  Prescriber OR PCP: Leena Blue MD  Last diagnosis associated with med order: There are no diagnoses linked to this encounter.   If protocol passes may refill for 12 months if within 3 months of last provider visit (or a total of 15 months).           Failed - Microalbumin in last year      Microalbumin, Random Urine   Date Value Ref Range Status   02/20/2018 0.70 0.00 - 1.99 mg/dL Final                  Passed - A1C in last 6 months     Hemoglobin A1c   Date Value Ref Range Status   02/21/2019 7.5 (H) 3.5 - 6.0 % Final               Passed - Blood pressure in last year     BP Readings from Last 1 Encounters:   02/21/19 140/81             Passed - Serum creatinine in last year     Creatinine   Date Value Ref Range Status   10/02/2018 0.87 0.60 - 1.10 mg/dL Final

## 2021-05-29 NOTE — TELEPHONE ENCOUNTER
I called pt and spoke with her to let her know that the paperwork is completed. Can  any time at the .

## 2021-05-30 ENCOUNTER — RECORDS - HEALTHEAST (OUTPATIENT)
Dept: ADMINISTRATIVE | Facility: CLINIC | Age: 61
End: 2021-05-30

## 2021-05-30 NOTE — TELEPHONE ENCOUNTER
To help speed up the process, I called pt to see what further questions and concerns she had regarding the [place] paperwork. Pt stated she only wanted to speak with provider and would like for provider to call her back. Provider is seeing pts at this time and I will relay message to Dr. Maria.

## 2021-05-30 NOTE — TELEPHONE ENCOUNTER
Pt is stating that she does not wish me to respond with any specific details regarding her health concerns.  She will notify me of any information she wants released to her  employer

## 2021-05-30 NOTE — ANESTHESIA PROCEDURE NOTES
Spinal Block    Patient location during procedure: OR  Start time: 7/12/2019 7:42 AM  End time: 7/12/2019 7:50 AM  Reason for block: primary anesthetic    Staffing:  Performing  Anesthesiologist: Ike Gonzalez MD    Preanesthetic Checklist  Completed: patient identified, risks, benefits, and alternatives discussed, timeout performed, consent obtained, airway assessed, oxygen available, suction available, emergency drugs available and hand hygiene performed  Spinal Block  Patient position: left lateral decubitus  Prep: ChloraPrep  Patient monitoring: heart rate, cardiac monitor, continuous pulse ox and blood pressure  Approach: midline  Location: L3-4  Injection technique: single-shot  Needle type: Quincke   Needle gauge: 22 G

## 2021-05-30 NOTE — ANESTHESIA CARE TRANSFER NOTE
Last vitals:   Vitals:    07/12/19 1005   BP: 125/77   Pulse: 74   Resp: 12   Temp: (!) 35.7  C (96.2  F)   SpO2: 100%     Patient's level of consciousness is awake  Spontaneous respirations: yes  Maintains airway independently: yes  Dentition unchanged: yes  Oropharynx: oropharynx clear of all foreign objects    QCDR Measures:  ASA# 20 - Surgical Safety Checklist: WHO surgical safety checklist completed prior to induction    PQRS# 430 - Adult PONV Prevention: 4558F - Pt received => 2 anti-emetic agents (different classes) preop & intraop  ASA# 8 - Peds PONV Prevention: NA - Not pediatric patient, not GA or 2 or more risk factors NOT present  PQRS# 424 - Ashley-op Temp Management: 4559F - At least one body temp DOCUMENTED => 35.5C or 95.9F within required timeframe  PQRS# 426 - PACU Transfer Protocol: - Transfer of care checklist used  ASA# 14 - Acute Post-op Pain: ASA14B - Patient did NOT experience pain >= 7 out of 10

## 2021-05-30 NOTE — TELEPHONE ENCOUNTER
Who is calling:  Patient  Reason for Call:  Patient states she would like to discuss FMLA with Provider.  Patient did not elaborate. This nurse informed patient Provider will not be in office until 7/8/19.  Patient understood.  Patient declined any discussion with covering Provider.  Date of last appointment with primary care: 10/23/18  Okay to leave a detailed message: Yes

## 2021-05-30 NOTE — TELEPHONE ENCOUNTER
Request for Orders    Who s Requesting: Home Care Physical Therapist    Orders being requested: PT 2 more times this week, then 3x next week; OT eval; HHA 2x/wk if needed.    Where to send Orders: respond via [employer]

## 2021-05-30 NOTE — TELEPHONE ENCOUNTER
Who is calling:  Patient  Reason for Call:  Patient is requesting a return phone call to discuss her FMLA.  Patient declined on providing any additional information.  Date of last appointment with primary care: 10/23/2018  Okay to leave a detailed message: Yes

## 2021-05-30 NOTE — PROGRESS NOTES
TCM DISCHARGE FOLLOW UP CALL    Discharge Date:  7/14/2019  Reason for hospital stay (discharge diagnosis)::  (R) tibial plateau fracture, (R) meniscus tear. s/p ORIF  Are you feeling better, the same or worse since your discharge?:  Patient is feeling better (Pain controlled with scheduled Tylenol and oxycodone. Immobilzer on. Has had a BM.)  Do you feel like you have a plan in the event of a health emergency?: Yes ()    As part of your discharge plan, were  home care services ordered for you?: Yes    Have you seen them yet, or are they scheduled to visit?: Yes    Do you have any follow up visits scheduled with your PCP or Specialist?:  Yes, with PCP and Yes, with Specialist (Needs ortho f/u appt.)  (RN) Is PCP appt scheduled soon enough (within 14 days of discharge date)?: Yes (7/19)    Who are you seeing and when is it scheduled?:  [place]

## 2021-05-30 NOTE — TELEPHONE ENCOUNTER
Controlled Substance Refill Request  Medication Name:   Requested Prescriptions     Pending Prescriptions Disp Refills     oxyCODONE (ROXICODONE) 5 MG immediate release tablet 40 tablet 0     Sig: Take 1 tablet (5 mg total) by mouth every 4 (four) hours.     Date Last Fill: 07/14/2019  Pharmacy: [facility] [place]      Submit electronically to pharmacy  Controlled Substance Agreement Date Scanned:   Encounter-Level CSA Scan Date:    There are no encounter-level csa scan date.       Last office visit with prescriber/PCP: 10/2/2018 Nivia Maria MD OR same dept: 10/2/2018 Nivia Maria MD OR same specialty: 10/2/2018 Nivia Maria MD  Last physical: Visit date not found Last MTM visit: Visit date not found        Patient states she is out of medication, and is questioning if refill can be expedited.

## 2021-05-30 NOTE — TELEPHONE ENCOUNTER
Request for Orders    Who s Requesting: Home Care Physical Therapist    Orders being requested: Continue PT 3x/wk x 2 wks    Where to send Orders: respond via [employer]

## 2021-05-30 NOTE — ANESTHESIA PREPROCEDURE EVALUATION
Anesthesia Evaluation      Patient summary reviewed   No history of anesthetic complications     Airway   Mallampati: II  Neck ROM: full   Pulmonary - negative ROS and normal exam                          Cardiovascular - negative ROS and normal exam  (+) , hypercholesterolemia,      Neuro/Psych - negative ROS     Endo/Other - negative ROS   (+) diabetes mellitus type 2 well controlled,      GI/Hepatic/Renal - negative ROS   (+) GERD,             Dental - normal exam                        Anesthesia Plan  Planned anesthetic: spinal    ASA 2     Anesthetic plan and risks discussed with: patient    Post-op plan: routine recovery

## 2021-05-30 NOTE — TELEPHONE ENCOUNTER
Request for Orders    Who s Requesting: Home Care Occupational Therapist    Orders being requested: OT ryan completed on 7/29/19, requesting 1w1 for ADL/IADLs    Where to send Orders: reply to message

## 2021-05-30 NOTE — ANESTHESIA POSTPROCEDURE EVALUATION
Patient: Jose Armando Wilson  OPEN REDUCTION INTERNAL FIXATION, FRACTURE, RIGHT TIBIAL PLATEAU, RIGHT MENISCAL REPAIR  Anesthesia type: spinal    Patient location: PACU  Last vitals:   Vitals Value Taken Time   /68 7/12/2019 12:04 PM   Temp 37  C (98.6  F) 7/12/2019 12:04 PM   Pulse 90 7/12/2019 12:04 PM   Resp 18 7/12/2019 12:04 PM   SpO2 96 % 7/12/2019 12:04 PM     Post vital signs: stable  Level of consciousness: awake and responds to simple questions  Post-anesthesia pain: pain controlled  Post-anesthesia nausea and vomiting: no  Pulmonary: unassisted, return to baseline  Cardiovascular: stable and blood pressure at baseline  Hydration: adequate  Anesthetic events: no    QCDR Measures:  ASA# 11 - Ashley-op Cardiac Arrest: ASA11B - Patient did NOT experience unanticipated cardiac arrest  ASA# 12 - Ashley-op Mortality Rate: ASA12B - Patient did NOT die  ASA# 13 - PACU Re-Intubation Rate: NA - No ETT / LMA used for case  ASA# 10 - Composite Anes Safety: ASA10A - No serious adverse event    Additional Notes:

## 2021-05-31 ENCOUNTER — RECORDS - HEALTHEAST (OUTPATIENT)
Dept: ADMINISTRATIVE | Facility: CLINIC | Age: 61
End: 2021-05-31

## 2021-05-31 VITALS — BODY MASS INDEX: 29.29 KG/M2 | WEIGHT: 187 LBS

## 2021-05-31 NOTE — PROGRESS NOTES
MTM Transition of Care Encounter  Assessment & Plan                                                     Post Discharge Medication Reconciliation Status: discharge medications reconciled and changed, per note/orders (see AVS)    Rectal Bleeding: Improved. Recommended watching for improvement in loose stools and nausea once done with abx in a few days. Follow up with Dr. Maria 9/10/19 and recommended asking her about aspirin at that time.     Tobacco Abuse: Congratulated patient on decreasing cigarette intake. Appears that nicotine patch was sent OTC -- she is interested in trying, therefore patch refilled today. Reviewed how to use.    PLAN:   1. Nicotine 14 mg patch refilled -- start     S/p right tibial fx: Pain improved. Recommended rechecking DEXA 11/2020. Last Vitamin D level WNL. Calcium intake?     Type 2 Diabetes:  poorly controlled. A1C was not at goal of <7%. FBG not at goal  mg/dL per reported values. Recommended checking BG 2 hours post prandial to verify whether glipizide is helpful. Reviewed that she could try metformin again, but switch to XR to see if that is more tolerable. Would recommend starting with 500 mg and increasing by 500 mg weekly or longer as tolerated.   Due for microalbuminuria recheck -- consider rechecking once A1c is controlled.   PLAN:   1. Consider retrying metofrmin but XR. Start at 500 mg and increase by 500 mg weekly as tolerated   2. Check 2 hour post prandial.  3. Future microalbuminuria once A1c at goal     Hyperlipidemia: Stable. Recommended to continue current regimen. Consider rechecking lipids with future A1c.     Vertigo: Stable. Recommended to continue current regimen.     GERD: Stable. Recommended to continue current regimen.     Follow Up  As needed with MTM    Subjective & Objective                                                       Jose Armando Wilson is a 59 y.o. female called for a transitions of care visit. she was discharged from [facility] on 8/21/19 for  rectal bleeding.    Chief Complaint: feeling ok, not great. Still a litle queezy.     Rectal Bleeding: presented to ED with complaints of abdominal pain, rectal bleeding.  Work-up included CT abdomen that showed some inflammatory changes of the colon.  C. difficile negative. Initial infectious stool studies were unremarkable.  Treated with IV ciprofloxacin and Flagyl for possible diverticulitis with improvement in symptoms. Discharged home with ciprofloxacin 500 mg two times a day and Flagyl 500 mg three times a day x 7 days. Aspirin held until completion of abx. Reports no more rectal bleeding, but is having soft stools. BMs 2-3x/day. Brown. Taking abx with food. No tendom pain. No vomiting or abdominal pain. No etoh.     Tobacco Abuse: Discharged home on nicotine patch 14 mg daily but she did no recieve. Is now smoking 2 cig per day, was smoking 1/2 ppd before hospital.     S/p right tibial fx: Taking no medication. Has not needed APAP. taking Vitamin D 2000 IU daily. Not really in pain. If she takes a APAP it is after therapy. Hydroxyzine was for cramping in her leg, but she has not needed in a while.   Last DEXA on 1/11/18 T score -1.1   Vitamin D, Total (25-Hydroxy)   Date Value Ref Range Status   12/19/2017 49.4 30.0 - 80.0 ng/mL Final     Type 2 Diabetes: Currently taking glipizide XL 10 mg daily.   Tests BG one time daily AM. Reports blood sugars: 200s. Does not think that glipizide is helpful at all. Was on metformin IR but sick to stomach.   Last A1c checked 7/12/19 = 8.4%.   Hypoglycemia sometimes <70 but not in last few weeks  Hyperglycemia symptoms: none.    Microalbumin checked 2/20/18    Hyperlipidemia: Currently taking rosuvastatin 40 mg daily and Zetia 10 mg daily. Denies myalgias. Last lipids checked 2016.    Vertigo: Currently taking venlafaxine  mg daily -- started at [place]. Reports that it helps a lot. Did not make vertigo go away but it is not as severe.     GERD: Currently taking  pantoprazole 40 mg daily. Reports a little GERD, once in a while.       PMH: reviewed in EPIC   Allergies/ADRs: reviewed in EPIC   Alcohol: reviewed in EPIC  Tobacco:   Social History     Tobacco Use   Smoking Status Former Smoker     Packs/day: 0.25   Smokeless Tobacco Never Used   Tobacco Comment    quit around 2014     Recent Vitals:   BP Readings from Last 3 Encounters:   08/23/19 99/68   08/22/19 130/81   08/22/19 130/81      Wt Readings from Last 3 Encounters:   08/20/19 200 lb 3 oz (90.8 kg)   07/13/19 200 lb (90.7 kg)   02/21/19 213 lb 6.4 oz (96.8 kg)     ----------------    The patient declined an after visit summary    I spent 20 minutes with this patient today;  . All changes were made via collaborative practice agreement with Nivia Maria MD. A copy of the visit note was provided to the patient's provider.     Mariel Mendez, Pharm.D., BCACP  Medication Therapy Management Pharmacist  [facility]     Current Outpatient Medications   Medication Sig Dispense Refill     ACCU-CHEK FASTCLIX Misc USE FOR TESTING TWO TIMES A  each 5     ACCU-CHEK SOFTCLIX LANCETS MISC Use As Directed 2 (two) times a day.       acetaminophen (TYLENOL) 500 MG tablet Take 500-1,000 mg by mouth every 6 (six) hours as needed for pain.       blood glucose test (ACCU-CHEK MAYNOR PLUS TEST STRP) strips USE FOR TESTING TWO TIMES A  strip 2     blood-glucose meter Misc Use As Directed. accu chek maynor plus       cholecalciferol, vitamin D3, 2,000 unit Tab Take 2,000 Units by mouth at bedtime.       ciprofloxacin HCl (CIPRO) 500 MG tablet Take 1 tablet (500 mg total) by mouth 2 (two) times a day for 7 days. 14 tablet 0     ezetimibe (ZETIA) 10 mg tablet Take 1 tablet by mouth daily. 90 tablet 2     glipiZIDE (GLUCOTROL XL) 10 MG 24 hr tablet Take 1 tablet (10 mg total) by mouth daily. 30 tablet 2     hydrOXYzine HCl (ATARAX) 10 MG tablet Take 2 tablets (20 mg total) by mouth every 6 (six) hours as  needed for itching (muscle spasms). 60 tablet 1     metroNIDAZOLE (FLAGYL) 500 MG tablet Take 1 tablet (500 mg total) by mouth 3 (three) times a day for 7 days. 21 tablet 0     nicotine (NICODERM CQ) 14 mg/24 hr Place 1 patch on the skin daily. 28 patch 0     pantoprazole (PROTONIX) 40 MG tablet Take 1 tablet (40 mg total) by mouth daily. 90 tablet 3     rosuvastatin (CRESTOR) 40 MG tablet Take 1 tablet by mouth at bedtime. 90 tablet 3     venlafaxine (EFFEXOR-XR) 150 MG 24 hr capsule Take 1 capsule (150 mg total) by mouth daily. 90 capsule 3     No current facility-administered medications for this visit.

## 2021-05-31 NOTE — TELEPHONE ENCOUNTER
Left message to call back for: orders request  Information to relay to patient:  Notified via  ok for requested order per Dr. Maria

## 2021-05-31 NOTE — PROGRESS NOTES
"TCM DISCHARGE FOLLOW UP CALL    Discharge Date:  8/21/2019  Reason for hospital stay (discharge diagnosis)::  Rectal bleeding, Abdominal pain  Are you feeling better, the same or worse since your discharge?:  Patient is feeling better (Abd pain improved.  Occasional upset stomach, but no vomiting.  Denies rectal bleeding.  Last BM was Tuesday morning, she is passing \"a little bit\" of gas.)  Do you feel like you have a plan in the event of a health emergency?: Yes (, call 911)    As part of your discharge plan, were  home care services ordered for you?: Yes    Have you seen them yet, or are they scheduled to visit?: Yes    Do you have any follow up visits scheduled with your PCP or Specialist?:  Yes, with PCP (Dr. Maria 9/10/19)  (RN) Is PCP appt scheduled soon enough (within 14 days of discharge date)?: No    (RN) Is the patient okay with moving PCP appointment up (if RN feels appropriate)?: No (Pt states PCP is on vacation, and she prefers to wait until she is back for her appt.)    RN NOTES::  RN reviewed discharge medications w/pt.  Pt states she is taking ciprofloxacin 500 mg twice a day and metronidazole 500 mg three times a day, but hasn't started the nicotine patch.  Stopped ASA as instructed.  States she's taking all of her other pre-hospital medications as previously directed.     Pt asked is ok to take metamucil as she's not had a BM since Tuesday.  RN advised metamucil is ok, push fluids, and or ok to try Miralax once a day, docusate sodium (per label instructions), or senna-docusate (per label instructions).  Pt verbalized understanding & agrees w/plan.      Nely Obrien RN Care Manager, Population Health    "

## 2021-05-31 NOTE — TELEPHONE ENCOUNTER
Request for Orders    Who s Requesting: Home Care Physical Therapist    Orders being requested: continue PT 2x/wk x 2 wks    Where to send Orders: respond via [employer]

## 2021-05-31 NOTE — TELEPHONE ENCOUNTER
Who is calling:  [facility] / OT   Reason for Call:  Calling to check status of order request. Please call and advise OT would like to see patient this afternoon/ evening.  # [phone]  Date of last appointment with primary care:   10/23/18  Okay to leave a detailed message: Yes

## 2021-05-31 NOTE — TELEPHONE ENCOUNTER
Request for Orders    Who s Requesting: Home Care Physical Therapist    Orders being requested: continue home PT 2x/wk x 3 wks    Where to send Orders: respond via [email or web address]

## 2021-05-31 NOTE — PROGRESS NOTES
Hospital discharge follow up call to pt, no answer.  Left VM message reminding patient to call clinic to schedule 1 week f/u w/PCP.  RN will attempt call back at another time.    Nely Obrien RN Care Manager, Population Health

## 2021-05-31 NOTE — TELEPHONE ENCOUNTER
Pt was discharged from the hospital for rectal bleeding on 8/21/2019. Pt is calling in to ask if it is okay for her to take some Metamucil, or some kind of stool softener, and if she should be taking something like that. Pt is not constipated, and is not having any issues, she just wants to make sure she is taking something if she should be. There is nothing about any stool softeners on the medication list from her discharge.     Please call Jose Armando at [phone].     Provider please advise.     Efrain Cortes RN [facility] Triage/Medication Refill

## 2021-05-31 NOTE — TELEPHONE ENCOUNTER
You haven't seen this patient since February.  Do you want her to schedule an office visit with you to discuss FMLA?

## 2021-05-31 NOTE — TELEPHONE ENCOUNTER
"Who is calling:  Patient  Reason for Call:  Patient requests call back from Dr Maria (as soon as possible) to discuss the FMLA. Also added they want to request a \"medical certification\" of need for her  to be off work with her.  Date of last appointment with primary care: 8/06/19  Okay to leave a detailed message: Yes      "

## 2021-05-31 NOTE — TELEPHONE ENCOUNTER
RN cannot approve Refill Request    RN can NOT refill this medication overdue for office visits and/or labs.    Lenin Peña, [facility] Triage/Med Refill 8/8/2019    Requested Prescriptions   Pending Prescriptions Disp Refills     glipiZIDE (GLUCOTROL XL) 10 MG 24 hr tablet [Pharmacy Med Name: glipiZIDE ER Oral Tablet Extended Release 24 Hour 10 MG] 30 tablet 2     Sig: Take 1 tablet (10 mg total) by mouth daily.       Oral Hypoglycemics Refill Protocol Failed - 8/8/2019 11:46 AM        Failed - Visit with PCP or prescribing provider visit in last 6 months       Last office visit with prescriber/PCP: Visit date not found OR same dept: 10/2/2018 Nivia Maria MD OR same specialty: 10/2/2018 Nivia Maria MD Last physical: Visit date not found Last MTM visit: Visit date not found         Next appt within 3 mo: Visit date not found  Next physical within 3 mo: Visit date not found  Prescriber OR PCP: Nivia Maria MD  Last diagnosis associated with med order: There are no diagnoses linked to this encounter.   If protocol passes may refill for 12 months if within 3 months of last provider visit (or a total of 15 months).           Failed - Microalbumin in last year      Microalbumin, Random Urine   Date Value Ref Range Status   02/20/2018 0.70 0.00 - 1.99 mg/dL Final                  Passed - A1C in last 6 months     Hemoglobin A1c   Date Value Ref Range Status   07/12/2019 8.4 (H) 4.2 - 6.1 % Final               Passed - Blood pressure in last year     BP Readings from Last 1 Encounters:   07/31/19 120/78             Passed - Serum creatinine in last year     Creatinine   Date Value Ref Range Status   07/14/2019 0.74 0.60 - 1.10 mg/dL Final

## 2021-06-01 NOTE — PROGRESS NOTES
ASSESSMENT/PLAN:  1. Type 2 diabetes mellitus without complication, without long-term current use of insulin (H)  Suboptimally controlled with a hemoglobin A1c of 7.8.  Like to discuss the feasibility of increasing the metformin although this has been problematic in the past.  She is on glipizide also but maximizing on this dose.  We may want to consider an additional medication.  She feels she can continue to work on diet also.  - Microalbumin, Random Urine  - metFORMIN (GLUCOPHAGE XR) 500 MG 24 hr tablet; Take 1 tablet (500 mg total) by mouth daily with breakfast.  Dispense: 30 tablet; Refill: 6  - Lipid Cascade FASTING  - Glycosylated Hemoglobin A1c    2. Cold right foot  Right foot feeling cold and having color changes.  She has a fracture on his lower leg and is still wearing the boot.  It somewhat painful when it happens.  Differential would include arterial injury, RDS, or complications related to the splinting.  We will send for arterial studies particularly with her history of vascular disease and have her follow-up with orthopedics for further evaluation.  - US Arterial Legs Bilateral Complete; Future    3. Gastroesophageal reflux disease without esophagitis  - pantoprazole (PROTONIX) 20 MG tablet; Take 1 tablet (20 mg total) by mouth daily.  Dispense: 30 tablet; Refill: 1    4. Mixed hyperlipidemia  Off of her cholesterol medication, her LDL is quite high.  She has been able to tolerate Crestor treatment will continue with this.    5. Peripheral vertigo, unspecified laterality  Long-standing history of peripheral vertigo resistant to conservative treatment with therapy.  She has had consults with neurology, ENT and physical therapy.  She has most successful working with male and I recommend that she return to [place] for further evaluation.    6. Essential hypertension, benign    7. Abnormal cervical Papanicolaou smear, unspecified abnormal pap finding  History of abnormal Pap smear status post colposcopy  due for repeat in November.    8. Need for influenza vaccination  - Influenza, Recombinant, Inj, Quadrivalent, PF, 18+YRS      Dragon dictation was used for this note.  Speech recognition errors are a possibility.      No follow-ups on file.  There are no Patient Instructions on file for this visit.    Orders Placed This Encounter   Procedures     US Arterial Legs Bilateral Complete     Standing Status:   Future     Standing Expiration Date:   9/10/2020     Order Specific Question:   Reason for Exam (Describe Symptoms):     Answer:   left leg tibial fracture 7/10 , purple discoloration, cool and painful into left foot, occ into right, hx PVD     Order Specific Question:   Is the patient pregnant?     Answer:   No     Order Specific Question:   Can the procedure be changed per Radiologist protocol?     Answer:   Yes     Influenza, Recombinant, Inj, Quadrivalent, PF, 18+YRS     Microalbumin, Random Urine     Lipid [place] FASTING     Order Specific Question:   Fasting is required?     Answer:   Yes     Glycosylated Hemoglobin A1c     Medications Discontinued During This Encounter   Medication Reason     pantoprazole (PROTONIX) 40 MG tablet          CHIEF COMPLAINT:  Chief Complaint   Patient presents with     Annual Exam     Leg Injury     Right leg       HISTORY OF PRESENT ILLNESS:  Jose Armando is a 59 y.o. female presenting to the clinic today for several concerns.  She for recheck of her diabetes.  She states her blood sugars overall have been fairly good.  She is not experiencing any chest pain breathing problems or lower extremity edema and she is taking her medications regularly.  Her hemoglobin A1c is 7.8 with a goal to be less than 7.  Her 2 biggest concerns are related to her dizziness and her right lower leg.  She became very dizzy and fell fracturing her tibia and fibula.  She is seeing orthopedics for this injury the nausea experiencing some pain in color changes in her right lower leg.  It is been becoming  violaceous in color and has been very cold.  Been happening since the injury but seems to be a little more prominent now.  She is able to ambulate.  Her balance is still an issue related to the vertigo.  Remainder of 12-point ROS is negative.      Social History     Tobacco Use   Smoking Status Former Smoker     Packs/day: 0.25   Smokeless Tobacco Never Used   Tobacco Comment    quit around 2014       Family History   Problem Relation Age of Onset     Breast cancer Sister 60     Diabetes Sister      Breast cancer Cousin 55     Valvular heart disease Mother      Diabetes Brother      Diabetes Sister        Social History     Socioeconomic History     Marital status:      Spouse name: Not on file     Number of children: Not on file     Years of education: Not on file     Highest education level: Not on file   Occupational History     Not on file   Social Needs     Financial resource strain: Not on file     Food insecurity:     Worry: Not on file     Inability: Not on file     Transportation needs:     Medical: Not on file     Non-medical: Not on file   Tobacco Use     Smoking status: Former Smoker     Packs/day: 0.25     Smokeless tobacco: Never Used     Tobacco comment: quit around 2014   Substance and Sexual Activity     Alcohol use: Yes     Comment: rare     Drug use: No     Sexual activity: Not on file   Lifestyle     Physical activity:     Days per week: Not on file     Minutes per session: Not on file     Stress: Not on file   Relationships     Social connections:     Talks on phone: Not on file     Gets together: Not on file     Attends Hoahaoism service: Not on file     Active member of club or organization: Not on file     Attends meetings of clubs or organizations: Not on file     Relationship status: Not on file     Intimate partner violence:     Fear of current or ex partner: Not on file     Emotionally abused: Not on file     Physically abused: Not on file     Forced sexual activity: Not on file  "  Other Topics Concern     Not on file   Social History Narrative     Not on file       Past Surgical History:   Procedure Laterality Date     arthroscopoy       KNEE ARTHROSCOPY W/ MENISCAL REPAIR Right 7/12/2019    Procedure: RIGHT MENISCAL REPAIR;  Surgeon: Gordo Schmid MD;  Location: [facility];  Service: Orthopedics     TIBIA FRACTURE SURGERY Right 7/12/2019    Procedure: OPEN REDUCTION INTERNAL FIXATION, FRACTURE, RIGHT TIBIAL PLATEAU;  Surgeon: Gordo Schmid MD;  Location: [facility];  Service: Orthopedics       No Known Allergies    Active Ambulatory Problems     Diagnosis Date Noted     Esophageal Reflux      Mixed hyperlipidemia      Dizziness      Vitamin D Deficiency      Cystocele      Migraine Headache      Peripheral Vertigo      Type 2 diabetes mellitus without complication (H) 12/19/2017     Closed fracture of lateral portion of right tibial plateau, initial encounter 07/10/2019     Dyslipidemia      Type 2 diabetes mellitus treated without insulin (H)      Closed fracture of tibial plateau, right, sequela      Essential hypertension, benign      Rectal bleed 08/20/2019     Resolved Ambulatory Problems     Diagnosis Date Noted     Diarrhea      Atypical Chest Pain      Dysthymic disorder      Tingling (Paresthesia)      Ataxic Gait      Seeing Flashing Lights (Photopsia)      Eustachian Tube Dysfunction Of Both Ears      Tibial plateau fracture, left, closed, initial encounter 07/10/2019     Past Medical History:   Diagnosis Date     Atypical chest pain      Diabetes mellitus (H)      Diabetes mellitus, type II (H)      Hyperglycemia      Hyperlipidemia      Hypertension      Vertigo      Vestibular migraine        VITALS:  Vitals:    09/10/19 1312   BP: 124/80   Patient Site: Left Arm   Patient Position: Sitting   Cuff Size: Adult Large   Pulse: (!) 110   SpO2: 98%   Weight: 184 lb 11.2 oz (83.8 kg)   Height: 5' 7\" (1.702 m)     Wt Readings from Last 3 Encounters: "   09/10/19 184 lb 11.2 oz (83.8 kg)   08/20/19 200 lb 3 oz (90.8 kg)   07/13/19 200 lb (90.7 kg)     Body mass index is 28.93 kg/m .    PHYSICAL EXAM:  General Appearance: Alert, pleasant, appears stated age  Head: Normocephalic, without obvious abnormality  Eyes: PERRL, conjunctiva/corneas clear, EOM's intact  Ears: Normal TM's and external ear canals, both ears  Nose: Nares normal, septum midline,mucosa normal, no drainage  Throat: Lips, mucosa, and tongue normal; teeth and gums normal; oropharynx is clear  Neck: Supple,without lymphadenopathy or thyromegaly  Lungs: Clear to auscultation bilaterally, respirations unlabored  Breasts: No palpable masses, tenderness, asymmetry, or nipple discharge. No axillary or supraclavicular lymphadenopathy  Heart: Regular rate and rhythm, no murmur   Abdomen: Soft, non-tender, no masses, no organomegaly  Pelvic: Normally developed genitalia with no external lesions or eruptions. Vagina and cervix show no lesions. No cystocele, No rectocele. Uterus normal.  No adnexal mass or tenderness.  Extremities right foot is cooler than the left.  Capillary refill is slightly delayed.  Difficulty palpating her pulses in bilateral lower extremities.  Skin: Skin color is more violaceous in bilateral legs, right leg greater than left, texture normal, no rashes or lesions  Neuro: Normal    RECENT RESULTS  Recent Results (from the past 48 hour(s))   Glycosylated Hemoglobin A1c    Collection Time: 09/10/19 12:55 PM   Result Value Ref Range    Hemoglobin A1c 7.8 (H) 3.5 - 6.0 %       MEDICATIONS:  Current Outpatient Medications   Medication Sig Dispense Refill     ACCU-CHEK FASTCLIX Misc USE FOR TESTING TWO TIMES A  each 5     acetaminophen (TYLENOL) 500 MG tablet Take 500-1,000 mg by mouth every 6 (six) hours as needed for pain.       blood glucose test (ACCU-CHEK DIANA PLUS TEST STRP) strips USE FOR TESTING TWO TIMES A  strip 2     blood-glucose meter Misc Use As Directed. accu  chek maynor plus       cholecalciferol, vitamin D3, 2,000 unit Tab Take 2,000 Units by mouth at bedtime.       ezetimibe (ZETIA) 10 mg tablet Take 1 tablet by mouth daily. 90 tablet 2     glipiZIDE (GLUCOTROL XL) 10 MG 24 hr tablet Take 1 tablet (10 mg total) by mouth daily. 30 tablet 2     hydrOXYzine HCl (ATARAX) 10 MG tablet Take 2 tablets (20 mg total) by mouth every 6 (six) hours as needed for itching (muscle spasms). 60 tablet 1     nicotine (NICODERM CQ) 14 mg/24 hr Place 1 patch on the skin daily. 28 patch 0     rosuvastatin (CRESTOR) 40 MG tablet Take 1 tablet by mouth at bedtime. 90 tablet 3     venlafaxine (EFFEXOR-XR) 150 MG 24 hr capsule Take 1 capsule (150 mg total) by mouth daily. 90 capsule 3     metFORMIN (GLUCOPHAGE XR) 500 MG 24 hr tablet Take 1 tablet (500 mg total) by mouth daily with breakfast. 30 tablet 6     pantoprazole (PROTONIX) 20 MG tablet Take 1 tablet (20 mg total) by mouth daily. 30 tablet 1     No current facility-administered medications for this visit.

## 2021-06-01 NOTE — TELEPHONE ENCOUNTER
----- Message from Nicole Suraez CMA sent at 9/13/2019  8:00 AM CDT -----      ----- Message -----  From: Nivia Maria MD  Sent: 9/12/2019   5:47 PM  To: Nivia Maria Care Team Pool    Please call pt and let her know her arterial studies are normal.  She should follow up in the next week with her orthopedic specialist or with me for recheck, more immediately if color changes become constant and pain increases

## 2021-06-02 VITALS — HEIGHT: 67 IN | WEIGHT: 207.25 LBS | BODY MASS INDEX: 32.53 KG/M2

## 2021-06-02 VITALS — WEIGHT: 213.4 LBS | BODY MASS INDEX: 33.49 KG/M2 | HEIGHT: 67 IN

## 2021-06-02 VITALS — WEIGHT: 202 LBS | BODY MASS INDEX: 31.64 KG/M2

## 2021-06-02 NOTE — TELEPHONE ENCOUNTER
Left message to call back for: medication question  Information to relay to patient:  Below message

## 2021-06-02 NOTE — TELEPHONE ENCOUNTER
Refill Approved    Rx renewed per Medication Renewal Policy. Medication was last renewed on 1/11/2019.    Last office visit: 09/10/2019 w/Dr. Maria PCP.    Loli Metz, [facility] Triage/Med Refill 10/13/2019     Requested Prescriptions   Pending Prescriptions Disp Refills     ezetimibe (ZETIA) 10 mg tablet [Pharmacy Med Name: Ezetimibe Oral Tablet 10 MG] 90 tablet 0     Sig: TAKE ONE TABLET BY MOUTH ONE TIME DAILY       Lovaza/Ezetimibe-Combination Refill Protocol Passed - 10/12/2019  7:00 AM        Passed - Fasting lipid cascade in last 12 months     Cholesterol   Date Value Ref Range Status   09/10/2019 230 (H) <=199 mg/dL Final     Triglycerides   Date Value Ref Range Status   09/10/2019 166 (H) <=149 mg/dL Final     HDL Cholesterol   Date Value Ref Range Status   09/10/2019 45 (L) >=50 mg/dL Final     LDL Calculated   Date Value Ref Range Status   09/10/2019 152 (H) <=129 mg/dL Final     Patient Fasting > 8hrs?   Date Value Ref Range Status   09/10/2019 Unknown  Final             Passed - PCP or prescribing provider visit in past 12 months       Last office visit with prescriber/PCP: 10/2/2018 Nivia Maria MD OR same dept: Visit date not found OR same specialty: 10/2/2018 Nivia Maria MD  Last physical: 9/10/2019 Last MTM visit: Visit date not found   Next visit within 3 mo: Visit date not found  Next physical within 3 mo: Visit date not found  Prescriber OR PCP: Nivia Maria MD  Last diagnosis associated with med order: 1. Mixed hyperlipidemia  - ezetimibe (ZETIA) 10 mg tablet [Pharmacy Med Name: Ezetimibe Oral Tablet 10 MG]; TAKE ONE TABLET BY MOUTH ONE TIME DAILY   Dispense: 90 tablet; Refill: 0    If protocol passes may refill for 12 months if within 3 months of last provider visit (or a total of 15 months).

## 2021-06-02 NOTE — TELEPHONE ENCOUNTER
Reason contacted:  Medication question  Information relayed:  Below message   Additional questions:  No  Further follow-up needed:  No  Okay to leave a detailed message:  No

## 2021-06-02 NOTE — TELEPHONE ENCOUNTER
Pt reports that at her appt with Dr Maria on 9-10-19 she thought she was told she was to replace glipizide with metformin. However, it appears that she was meant to take metformin in addition to the glipizide.     Please call pt directly to clarify this.    Thank you!

## 2021-06-02 NOTE — TELEPHONE ENCOUNTER
RN cannot approve Refill Request    RN can NOT refill this medication PCP messaged that patient is overdue for Labs. Last office visit: 10/2/2018 Nivia Maria MD Last Physical: 9/10/2019 Last MTM visit: Visit date not found Last visit same specialty: 10/2/2018 Nivia Maria MD.  Next visit within 3 mo: Visit date not found  Next physical within 3 mo: Visit date not found      Efrain Cortes, [facility] Triage/Med Refill 10/31/2019    Requested Prescriptions   Pending Prescriptions Disp Refills     glipiZIDE (GLUCOTROL XL) 10 MG 24 hr tablet [Pharmacy Med Name: glipiZIDE ER Oral Tablet Extended Release 24 Hour 10 MG] 30 tablet 1     Sig: Take 1 tablet (10 mg total) by mouth daily.       Oral Hypoglycemics Refill Protocol Failed - 10/31/2019  7:00 AM        Failed - Microalbumin in last year      Microalbumin, Random Urine   Date Value Ref Range Status   02/20/2018 0.70 0.00 - 1.99 mg/dL Final                  Passed - Visit with PCP or prescribing provider visit in last 6 months       Last office visit with prescriber/PCP: Visit date not found OR same dept: Visit date not found OR same specialty: 10/2/2018 Nivia Maria MD Last physical: 9/10/2019 Last MTM visit: Visit date not found         Next appt within 3 mo: Visit date not found  Next physical within 3 mo: Visit date not found  Prescriber OR PCP: Nivia Maria MD  Last diagnosis associated with med order: 1. Type 2 diabetes mellitus without complication, without long-term current use of insulin (H)  - glipiZIDE (GLUCOTROL XL) 10 MG 24 hr tablet [Pharmacy Med Name: glipiZIDE ER Oral Tablet Extended Release 24 Hour 10 MG]; Take 1 tablet (10 mg total) by mouth daily.  Dispense: 30 tablet; Refill: 1     If protocol passes may refill for 12 months if within 3 months of last provider visit (or a total of 15 months).           Passed - A1C in last 6 months     Hemoglobin A1c   Date Value Ref Range Status   09/10/2019 7.8 (H) 3.5 - 6.0  % Final               Passed - Blood pressure in last year     BP Readings from Last 1 Encounters:   10/14/19 115/80             Passed - Serum creatinine in last year     Creatinine   Date Value Ref Range Status   08/21/2019 0.86 0.60 - 1.10 mg/dL Final

## 2021-06-02 NOTE — TELEPHONE ENCOUNTER
She should be on both glipizide and metformin if possible. If she does not tolerate the metformin, stop and we will find a substitute medication.

## 2021-06-02 NOTE — TELEPHONE ENCOUNTER
Per 9/10/2019 OV note:   ASSESSMENT/PLAN:  1. Type 2 diabetes mellitus without complication, without long-term current use of insulin (H)  Suboptimally controlled with a hemoglobin A1c of 7.8.  Like to discuss the feasibility of increasing the metformin although this has been problematic in the past.  She is on glipizide also but maximizing on this dose.  We may want to consider an additional medication.  She feels she can continue to work on diet also.  - Microalbumin, Random Urine  - metFORMIN (GLUCOPHAGE XR) 500 MG 24 hr tablet; Take 1 tablet (500 mg total) by mouth daily with breakfast.  Dispense: 30 tablet; Refill: 6  - Lipid Cascade FASTING  - Glycosylated Hemoglobin A1c       Should patient take metformin 500 mg once daily in addition to glipizide 10 mg once daily?

## 2021-06-03 VITALS — BODY MASS INDEX: 31.39 KG/M2 | HEIGHT: 67 IN | WEIGHT: 200 LBS

## 2021-06-03 VITALS
BODY MASS INDEX: 28.99 KG/M2 | OXYGEN SATURATION: 98 % | HEART RATE: 110 BPM | WEIGHT: 184.7 LBS | DIASTOLIC BLOOD PRESSURE: 80 MMHG | SYSTOLIC BLOOD PRESSURE: 124 MMHG | HEIGHT: 67 IN

## 2021-06-03 NOTE — TELEPHONE ENCOUNTER
Refill Approved    Rx renewed per Medication Renewal Policy. Medication was last renewed on 11/17/18.    Marixa ESTELLE Pepper, [facility] Triage/Med Refill 11/18/2019     Requested Prescriptions   Pending Prescriptions Disp Refills     venlafaxine (EFFEXOR-XR) 150 MG 24 hr capsule [Pharmacy Med Name: Venlafaxine HCl ER Oral Capsule Extended Release 24 Hour 150 MG] 90 capsule 0     Sig: TAKE 1 CAPSULE BY MOUTH EVERY DAY       Venlafaxine/Desvenlafaxine Refill Protocol Passed - 11/18/2019  7:01 AM        Passed - LFT or AST or ALT in last year     Albumin   Date Value Ref Range Status   08/21/2019 2.9 (L) 3.5 - 5.0 g/dL Final     Bilirubin, Total   Date Value Ref Range Status   08/21/2019 0.3 0.0 - 1.0 mg/dL Final     Bilirubin, Direct   Date Value Ref Range Status   08/19/2019 0.1 <=0.5 mg/dL Final     Alkaline Phosphatase   Date Value Ref Range Status   08/21/2019 83 45 - 120 U/L Final     AST   Date Value Ref Range Status   08/21/2019 19 0 - 40 U/L Final     ALT   Date Value Ref Range Status   08/21/2019 24 0 - 45 U/L Final     Protein, Total   Date Value Ref Range Status   08/21/2019 5.8 (L) 6.0 - 8.0 g/dL Final                Passed - Fasting lipid cascade in last year     Cholesterol   Date Value Ref Range Status   09/10/2019 230 (H) <=199 mg/dL Final     Triglycerides   Date Value Ref Range Status   09/10/2019 166 (H) <=149 mg/dL Final     HDL Cholesterol   Date Value Ref Range Status   09/10/2019 45 (L) >=50 mg/dL Final     LDL Calculated   Date Value Ref Range Status   09/10/2019 152 (H) <=129 mg/dL Final     Patient Fasting > 8hrs?   Date Value Ref Range Status   09/10/2019 Unknown  Final             Passed - PCP or prescribing provider visit in last year     Last office visit with prescriber/PCP: 10/2/2018 Nivia Maria MD OR same dept: Visit date not found OR same specialty: 10/2/2018 Nivia Maria MD  Last physical: 9/10/2019 Last MTM visit: Visit date not found   Next visit within 3 mo:  Visit date not found  Next physical within 3 mo: Visit date not found  Prescriber OR PCP: Nivia Maria MD  Last diagnosis associated with med order: 1. Depression  - venlafaxine (EFFEXOR-XR) 150 MG 24 hr capsule [Pharmacy Med Name: Venlafaxine HCl ER Oral Capsule Extended Release 24 Hour 150 MG]; TAKE 1 CAPSULE BY MOUTH EVERY DAY   Dispense: 90 capsule; Refill: 0    If protocol passes may refill for 12 months if within 3 months of last provider visit (or a total of 15 months).             Passed - Blood Pressure in last year     BP Readings from Last 1 Encounters:   10/14/19 115/80

## 2021-06-03 NOTE — TELEPHONE ENCOUNTER
Refill Approved    Rx renewed per Medication Renewal Policy. Medication was last renewed on 11/17/2018 with 3 refills.  Last office visit: 9/10/2019 with PCP Dr KLEBER Metz, [facility] Triage/Med Refill 11/27/2019     Requested Prescriptions   Pending Prescriptions Disp Refills     rosuvastatin (CRESTOR) 40 MG tablet [Pharmacy Med Name: Rosuvastatin Calcium Oral Tablet 40 MG] 90 tablet 0     Sig: TAKE ONE TABLET BY MOUTH AT BEDTIME       Statins Refill Protocol (Hmg CoA Reductase Inhibitors) Passed - 11/25/2019  3:57 PM        Passed - PCP or prescribing provider visit in past 12 months      Last office visit with prescriber/PCP: 10/2/2018 Nivia Maria MD OR same dept: Visit date not found OR same specialty: 10/2/2018 Nivia Maria MD  Last physical: 9/10/2019 Last MTM visit: Visit date not found   Next visit within 3 mo: Visit date not found  Next physical within 3 mo: Visit date not found  Prescriber OR PCP: Nivia Maria MD  Last diagnosis associated with med order: 1. HLD (hyperlipidemia)  - rosuvastatin (CRESTOR) 40 MG tablet [Pharmacy Med Name: Rosuvastatin Calcium Oral Tablet 40 MG]; TAKE ONE TABLET BY MOUTH AT BEDTIME   Dispense: 90 tablet; Refill: 0    If protocol passes may refill for 12 months if within 3 months of last provider visit (or a total of 15 months).

## 2021-06-03 NOTE — TELEPHONE ENCOUNTER
Refill Approved    Rx renewed per Medication Renewal Policy. Medication was last renewed on 9/10/19.    Melissa Mederos, [facility] Triage/Med Refill 11/7/2019     Requested Prescriptions   Pending Prescriptions Disp Refills     pantoprazole (PROTONIX) 20 MG tablet [Pharmacy Med Name: Pantoprazole Sodium Oral Tablet Delayed Release 20 MG] 30 tablet 0     Sig: Take 1 tablet (20 mg total) by mouth daily.       GI Medications Refill Protocol Passed - 11/7/2019  7:00 AM        Passed - PCP or prescribing provider visit in last 12 or next 3 months.     Last office visit with prescriber/PCP: 10/2/2018 Nivia Maria MD OR same dept: Visit date not found OR same specialty: 10/2/2018 Nivia Maria MD  Last physical: 9/10/2019 Last MTM visit: Visit date not found   Next visit within 3 mo: Visit date not found  Next physical within 3 mo: Visit date not found  Prescriber OR PCP: Nivia Maria MD  Last diagnosis associated with med order: 1. Gastroesophageal reflux disease without esophagitis  - pantoprazole (PROTONIX) 20 MG tablet [Pharmacy Med Name: Pantoprazole Sodium Oral Tablet Delayed Release 20 MG]; Take 1 tablet (20 mg total) by mouth daily.  Dispense: 30 tablet; Refill: 0    If protocol passes may refill for 12 months if within 3 months of last provider visit (or a total of 15 months).

## 2021-06-04 VITALS
BODY MASS INDEX: 30.68 KG/M2 | DIASTOLIC BLOOD PRESSURE: 80 MMHG | WEIGHT: 195.9 LBS | OXYGEN SATURATION: 99 % | HEART RATE: 93 BPM | SYSTOLIC BLOOD PRESSURE: 138 MMHG

## 2021-06-04 VITALS
OXYGEN SATURATION: 98 % | WEIGHT: 191 LBS | SYSTOLIC BLOOD PRESSURE: 110 MMHG | DIASTOLIC BLOOD PRESSURE: 80 MMHG | HEART RATE: 118 BPM | BODY MASS INDEX: 29.91 KG/M2

## 2021-06-04 VITALS — WEIGHT: 197 LBS | BODY MASS INDEX: 30.85 KG/M2

## 2021-06-04 VITALS
SYSTOLIC BLOOD PRESSURE: 120 MMHG | HEART RATE: 92 BPM | WEIGHT: 196.9 LBS | DIASTOLIC BLOOD PRESSURE: 72 MMHG | BODY MASS INDEX: 30.84 KG/M2 | OXYGEN SATURATION: 97 %

## 2021-06-04 NOTE — PROGRESS NOTES
ASSESSMENT/PLAN:  1. Type 2 diabetes mellitus without complication, without long-term current use of insulin (H)  Uncontrolled type 2 diabetes.  Her diet has been primary rarely primarily to blame she states.  She feels that there is a lot of opportunity for her to change how she is eating and she preferred to do this before changing medications.  She is taking her metformin at 500 mg extended release.  This is most that she can tolerate.  She is currently on glipizide at 10 mg daily.  Urged yearly eye exams, daily aspirin.  She has a history of hyperlipidemia and difficulty tolerating most statins.  We have been able to maintain her on Crestor and Zetia which brings her somewhat into range.  Difficulty tolerating her  - Glycosylated Hemoglobin A1c    2. Visit for screening mammogram  - Mammo Screening Bilateral; Future    3. Dyslipidemia  Statins.  She tolerates Crestor and Zetia.  She has a strong familial component to her hyperlipidemia with LDLs in the 300s off of any medications.  She is currently not at goal but certainly significantly improved from her baseline.  She continues on these medications and an aspirin daily.  - Lipid Cross FASTING    4. Abnormal cervical Papanicolaou smear, unspecified abnormal pap finding  Known history of abnormal Pap smears status post a LEEP procedure 6 months ago.  She is here to have a repeat Pap smear.  She has been reporting some shortness of breath.  Like her to undergo spirometry.  She does have a history of smoking and certainly this could represent an early COPD.  - Gynecologic Cytology (PAP Smear)  - HPV High Risk DNA Cervical    5. Shortness of breath  She is been having some more baseline shortness of breath with activities.  Could represent deconditioning versus an early COPD as she has a history of smoking.  Will have her undergo spirometry and continue to monitor symptoms. Consider a trial of bronchodilator therapy.  - Spirometry without bronchodilator      All  medications have been reviewed and reconciled.    No follow-ups on file.    There are no Patient Instructions on file for this visit.    Orders Placed This Encounter   Procedures     Mammo Screening Bilateral     Standing Status:   Future     Standing Expiration Date:   3/17/2021     Order Specific Question:   Patient's previous breast density:     Answer:   Scattered fibroglandular density [2]     Order Specific Question:   Is the patient pregnant?     Answer:   No     Order Specific Question:   Can the procedure be changed per Radiologist protocol?     Answer:   Yes     Pneumococcal polysaccharide vaccine 23-valent 1 yo or older, subq/IM     Glycosylated Hemoglobin A1c     Lipid Cascade FASTING     Order Specific Question:   Fasting is required?     Answer:   Yes     Spirometry without bronchodilator     There are no discontinued medications.      CHIEF COMPLAINT;  Chief Complaint   Patient presents with     Pap Smear     Labs     A1c- completed       HISTORY OF PRESENT ILLNESS:  Jose Armando is a 59 y.o. female presenting to the clinic today A1C check and pap smear.    Diabetes: Her A1C was 7.4 today. She has not been checking her blood sugars on her own lately. She has been eating a lot of junk food. It was difficulty to control her diet when her  was doing the cooking, and because she was having a hard time with her leg. She feels like there is a good chance that she will change her diet. She has been taking the metformin and glipizide.     Shortness of breath: She gets short of breath very quickly. She gets short of breath when doing certain activities, it is not all the time. She has never used an inhaler. She used to smoke, and she smoked for a long time. She does not have a cough.    Hyperlipidemia: She is fasting today and can get her cholesterol labs checked. Her cholesterol is generally elevated.     Health maintenance: She will get her pap smear today. Her last period was 7-8 years ago. Prior to her last  period, her menstrual cycle was sporadic and her second to last period was a year prior. She is due for a mammogram, she will get that done this year. She will get her pneumonia vaccine today. She already got her flu vaccine.      REVIEW OF SYSTEMS:  She denies any fevers, chills, pedal edema, or pain in her calves. All other systems are negative.    PFSH:  She has never had a history of blood clots. Reviewed, as below.    TOBACCO USE:  Social History     Tobacco Use   Smoking Status Former Smoker     Packs/day: 0.25   Smokeless Tobacco Never Used   Tobacco Comment    quit around 2014       VITALS:  Vitals:    12/17/19 1028   BP: 120/72   Patient Site: Left Arm   Patient Position: Sitting   Cuff Size: Adult Large   Pulse: 92   SpO2: 97%   Weight: 196 lb 14.4 oz (89.3 kg)     Wt Readings from Last 3 Encounters:   12/27/19 191 lb (86.6 kg)   12/17/19 196 lb 14.4 oz (89.3 kg)   09/10/19 184 lb 11.2 oz (83.8 kg)     Body mass index is 30.84 kg/m .    PHYSICAL EXAM:  GENERAL APPEARANCE: Alert, cooperative, no distress, appears stated age  HEAD: Normocephalic, without obvious abnormality, atraumatic  EYES:  PERRL, conjunctiva/corneas clear, EOM's intact, fundi     benign, both eyes       EARS: Normal TM's and external ear canals, both ears  NOSE:  Nares normal, septum midline, mucosa normal, no drainage or sinus tenderness  THROAT: Lips, mucosa, and tongue normal; teeth and gums normal  NECK: Supple, symmetrical, trachea midline, no adenopathy;    thyroid:  No enlargement/tenderness/nodules; no carotid    bruit or JVD  LUNGS: Clear to auscultation bilaterally, respirations unlabored  CHEST WALL: No tenderness or deformity  HEART: Regular rate and rhythm, S1 and S2 normal, no murmur, rub or gallop  EXTREMITIES: Extremities normal, atraumatic, no cyanosis or edema  PULSES: 2+ and symmetric all extremities  SKIN: Skin color, texture, turgor normal, no rashes or lesions  LYMPH NODES: Cervical, supraclavicular, and axillary  nodes normal  NEUROLOGIC: CNII-XII intact. Normal strength, sensation and reflexes       throughout    RECENT RESULTS  Recent Results (from the past 48 hour(s))   HM2(CBC w/o Differential)    Collection Time: 12/27/19  2:25 PM   Result Value Ref Range    WBC 5.9 4.0 - 11.0 thou/uL    RBC 5.22 3.80 - 5.40 mill/uL    Hemoglobin 15.2 12.0 - 16.0 g/dL    Hematocrit 45.4 35.0 - 47.0 %    MCV 87 80 - 100 fL    MCH 29.2 27.0 - 34.0 pg    MCHC 33.6 32.0 - 36.0 g/dL    RDW 12.7 11.0 - 14.5 %    Platelets 316 140 - 440 thou/uL    MPV 7.2 7.0 - 10.0 fL       ADDITIONAL HISTORY SUMMARIZED (2): None.  DECISION TO OBTAIN EXTRA INFORMATION (1): None.  RADIOLOGY TESTS (1): Reviewed CT of the pelvis from 8/20/2019. Mammogram ordered.   LABS (1): Labs ordered - she is fasting today.  MEDICINE TESTS (1): None.  INDEPENDENT REVIEW (2 each): None.    The visit lasted a total of 20 minutes face to face with the patient. Over 50% of the time was spent counseling and educating the patient about A1C and pap smear.    IElmira, am scribing for and in the presence of, Dr. Maria.    I, Dr. Maria, personally performed the services described in this documentation, as scribed by Elmira Kerr in my presence, and it is both accurate and complete.    Dragon dictation was used for this note.  Speech recognition errors are a possibility.    MEDICATIONS:  Current Outpatient Medications   Medication Sig Dispense Refill     ACCU-CHEK FASTCLIX Misc USE FOR TESTING TWO TIMES A  each 5     acetaminophen (TYLENOL) 500 MG tablet Take 500-1,000 mg by mouth every 6 (six) hours as needed for pain.       blood glucose test (ACCU-CHEK DIANA PLUS TEST STRP) strips USE FOR TESTING TWO TIMES A  strip 2     blood-glucose meter Misc Use As Directed. accu chek diana plus       cholecalciferol, vitamin D3, 2,000 unit Tab Take 2,000 Units by mouth at bedtime.       ezetimibe (ZETIA) 10 mg tablet Take 1 tablet (10 mg total) by mouth daily. 90 tablet  3     glipiZIDE (GLUCOTROL XL) 10 MG 24 hr tablet Take 1 tablet (10 mg total) by mouth daily. 30 tablet 1     metFORMIN (GLUCOPHAGE XR) 500 MG 24 hr tablet Take 1 tablet (500 mg total) by mouth daily with breakfast. 30 tablet 6     pantoprazole (PROTONIX) 20 MG tablet Take 1 tablet (20 mg total) by mouth daily. 90 tablet 3     rosuvastatin (CRESTOR) 40 MG tablet Take 1 tablet (40 mg total) by mouth at bedtime. 90 tablet 3     venlafaxine (EFFEXOR-XR) 150 MG 24 hr capsule TAKE 1 CAPSULE BY MOUTH EVERY DAY  90 capsule 3     No current facility-administered medications for this visit.        Total data points: 2

## 2021-06-04 NOTE — PROGRESS NOTES
Assessment/Plan:    1. Colitis  History of colitis with hospitalization August 2019 reviewed.  Patient has not seen gastroenterologist in follow-up.  Did recommend referral to see gastroenterology plus consideration for repeat colonoscopy.  CBC obtained today.  Do not feel current diverticulitis on exam and will avoid further antibiotics.  Patient remains off NSAIDs.  Was told to discontinue metformin as noted below as potential side effect since initiation of medicine following prior hospitalization.  - Ambulatory referral to Gastroenterology  - [facility](CBC w/o Differential)    2. Diarrhea, unspecified type  As above, discontinue metformin extended release 500 mg daily with potential diarrhea and GI distress side effects.    3. Type 2 diabetes mellitus without complication, without long-term current use of insulin (H)  Type 2 diabetes fair control with A1c of 7.4% December 17, 2019.  Continues glipizide extended release 10 mg daily plus metformin extended release 500 mg once daily.  Will discontinue metformin however as noted above due to GI distress etc.  Should follow-up with Dr. Maria in next 4 weeks to reassess both diarrhea/colitis as well as diabetes management.  - Basic Metabolic Panel          Subjective:    Jose Armando Wilson is seen today for evaluation of potential colitis.  Had issues requiring hospitalization in August and was discharged from hospital August 21, 2019.  Question diverticulitis.  Completed Flagyl and Cipro.  C. difficile negative and stool cultures were unremarkable apparently.  Does not feel like she ever really truly got better.  Was started on metformin extended release 500 mg once daily.  Some diarrhea persists with loose stools.  Some bloody mucus more recently however less blood currently.  No personal or family history of ulcerative colitis identified.  No fevers.  Mild nausea without vomiting.  Did have prior right tibial plateau fracture associated with vertiginous episode in which he  aj.  Was taking aspirin initially prior to admission with potential for NSAID associated colitis.  Avoids NSAIDS currently    Reviewed d/c summary from 8/21/19:  59-year-old female presented to the emergency department with complaints of abdominal pain, rectal bleeding.  Work-up included CT abdomen that showed some inflammatory changes of the colon.  C. difficile negative.  Initial infectious stool studies were unremarkable.  Treated with IV ciprofloxacin and Flagyl for possible diverticulitis with improvement in her symptoms.  The day of discharge she is feeling much better, tolerating oral intake.  She will go home with continued 7 days of oral ciprofloxacin and Flagyl.    Past Surgical History:   Procedure Laterality Date     arthroscopoy       KNEE ARTHROSCOPY W/ MENISCAL REPAIR Right 7/12/2019    Procedure: RIGHT MENISCAL REPAIR;  Surgeon: Gordo Schmid MD;  Location: [facility];  Service: Orthopedics     TIBIA FRACTURE SURGERY Right 7/12/2019    Procedure: OPEN REDUCTION INTERNAL FIXATION, FRACTURE, RIGHT TIBIAL PLATEAU;  Surgeon: Gordo Schmid MD;  Location: [facility];  Service: Orthopedics        Family History   Problem Relation Age of Onset     Breast cancer Sister 60     Diabetes Sister      Breast cancer Cousin 55     Valvular heart disease Mother      Diabetes Brother      Diabetes Sister         Past Medical History:   Diagnosis Date     Atypical chest pain      Diabetes mellitus (H)      Diabetes mellitus, type II (H)      Hyperglycemia      Hyperlipidemia      Hypertension      Vertigo      Vestibular migraine         Social History     Tobacco Use     Smoking status: Former Smoker     Packs/day: 0.25     Smokeless tobacco: Never Used     Tobacco comment: quit around 2014   Substance Use Topics     Alcohol use: Yes     Comment: rare     Drug use: No        Current Outpatient Medications   Medication Sig Dispense Refill     ACCU-CHEK FASTCLIX Misc USE FOR TESTING TWO TIMES A   each 5     acetaminophen (TYLENOL) 500 MG tablet Take 500-1,000 mg by mouth every 6 (six) hours as needed for pain.       blood glucose test (ACCU-CHEK DIANA PLUS TEST STRP) strips USE FOR TESTING TWO TIMES A  strip 2     blood-glucose meter Misc Use As Directed. accu chek diana plus       cholecalciferol, vitamin D3, 2,000 unit Tab Take 2,000 Units by mouth at bedtime.       ezetimibe (ZETIA) 10 mg tablet Take 1 tablet (10 mg total) by mouth daily. 90 tablet 3     glipiZIDE (GLUCOTROL XL) 10 MG 24 hr tablet Take 1 tablet (10 mg total) by mouth daily. 30 tablet 1     metFORMIN (GLUCOPHAGE XR) 500 MG 24 hr tablet Take 1 tablet (500 mg total) by mouth daily with breakfast. 30 tablet 6     pantoprazole (PROTONIX) 20 MG tablet Take 1 tablet (20 mg total) by mouth daily. 90 tablet 3     rosuvastatin (CRESTOR) 40 MG tablet Take 1 tablet (40 mg total) by mouth at bedtime. 90 tablet 3     venlafaxine (EFFEXOR-XR) 150 MG 24 hr capsule TAKE 1 CAPSULE BY MOUTH EVERY DAY  90 capsule 3     hydrOXYzine HCl (ATARAX) 10 MG tablet Take 2 tablets (20 mg total) by mouth every 6 (six) hours as needed for itching (muscle spasms). 60 tablet 1     No current facility-administered medications for this visit.           Objective:    Vitals:    12/27/19 1336   BP: 110/80   Pulse: (!) 118   SpO2: 98%   Weight: 191 lb (86.6 kg)      Body mass index is 29.91 kg/m .    Alert.  No apparent distress.  Transfers independently however slowly.  Uses cane to assist with ambulation.  Chest clear.  Cardiac exam regular without tachycardia.  Abdomen positive bowel sounds.  No abdominal distention.  No guarding or rebound.  Extremities warm and dry.      59-year-old female presented to the emergency department with complaints of abdominal pain, rectal bleeding.  Work-up included CT abdomen that showed some inflammatory changes of the colon.  C. difficile negative.  Initial infectious stool studies were unremarkable.  Treated with IV  ciprofloxacin and Flagyl for possible diverticulitis with improvement in her symptoms.  The day of discharge she is feeling much better, tolerating oral intake.  She will go home with continued 7 days of oral ciprofloxacin and Flagyl.      This note has been dictated using voice recognition software and as a result may contain minor grammatical errors and unintended word substitutions.

## 2021-06-05 VITALS
WEIGHT: 197.7 LBS | SYSTOLIC BLOOD PRESSURE: 110 MMHG | OXYGEN SATURATION: 97 % | HEART RATE: 85 BPM | DIASTOLIC BLOOD PRESSURE: 70 MMHG | BODY MASS INDEX: 31.03 KG/M2 | HEIGHT: 67 IN

## 2021-06-05 NOTE — PROGRESS NOTES
Assessment: Jose Armando is here alone today for her Diabetes education.    She is currently taking GLipizide XL 10mg in the evening.  She was on Metformin, this was recently stopped due to side effects.  Trulicity 0.75mg was started, she picked this up from the pharmacy but has not yet started taking it.  Reviewed administration technique, when to take, site selection and rotation as well as possible side effects.  Stated she will probably start taking this on Sunday.    She is checking her blood sugars, not every day.  This morning her fasting reading was 121.  She has noticed her readings have been a bit higher since stopping Metformin.  Asked her to check her blood sugars once daily.    We reviewed carb counting, carb ID, how many grams of carb to eat in a sitting, how often to eat and the role protein plays in glucose control.    Her activity is limited due to her mobility and vertigo.  Stated she tries to be as active as possible.    All additional questions and concerns addressed today.    Plan: Jose Armando will start her Trulicity.  She will call if she is having vomiting or abdominal pain.  If she experiences nausea that does not improve in 2 weeks, asked her to call as well.    Subjective and Objective:      Jose Armando Wilson is referred by Dr. Nivia Maria for Diabetes Education.     Lab Results   Component Value Date    HGBA1C 7.4 (H) 12/17/2019       Follow up:   Primary care visit  CDE (certified diabetic educator)      Education:     Monitoring   Meter (per above goals): Assessed and Discussed  Monitoring: Assessed and Discussed  BG goals: Assessed and Discussed    Nutrition Management  Nutrition Management: Assessed and Discussed  Weight: Assessed and Discussed  Portions/Balance: Assessed, Discussed and Literature provided  Carb ID/Count: Assessed, Discussed and Literature provided  Label Reading: Not addressed  Heart Healthy Fats: Assessed, Discussed and Literature provided  Menu Planning: Assessed and  Discussed  Dining Out: Assessed and Discussed  Physical Activity: Assessed and Discussed  Medications: Assessed and Discussed  Orals: Assessed and Discussed  Injected Medications: Assessed and Discussed   Storage/Exp:Assessed and Discussed   Site Rotation: Assessed and Discussed   Sites Assessed: no    Diabetes Disease Process: Assessed and Discussed    Acute Complications: Prevent, Detect, Treat:  Hypoglycemia: Assessed and Discussed  Hyperglycemia: Assessed and Discussed  Sick Days: Assessed and Discussed  Driving: Not addressed    Chronic Complications  Goal Setting and Problem Solving: Assessed and Discussed  Barriers: Assessed and Discussed  Psychosocial Adjustments: Assessed and Discussed      Time spent with the patient: 30 minutes for diabetes education and counseling.   Previous Education: no  Visit Type:DSMT  Hours Remaining: DSMT 1.5 and MNT 2      Jeanine Childs  2/5/2020

## 2021-06-05 NOTE — PROGRESS NOTES
ASSESSMENT/PLAN:  1. Type 2 diabetes mellitus without complication, without long-term current use of insulin (H)  Metformin was causing side effects and discontinued. Tolerating glipizide.  Hgb A1c 7.4.  Will replace metformin with Trulicity.  Referred for diabetes education.  Follow up in 6 weeks  - dulaglutide 0.75 mg/0.5 mL PnIj; Inject 0.75 mg under the skin every 7 days.  Dispense: 4 Syringe; Refill: 4  - Ambulatory referral to Diabetes Education Program (CDE)    Diabetes Quality  Lab Results   Component Value Date    HGBA1C 7.4 (H) 12/17/2019     Lab Results   Component Value Date    MICROALBUR 0.70 02/20/2018     Lab Results   Component Value Date    LDLCALC 140 (H) 12/17/2019     BP Readings from Last 1 Encounters:   01/23/20 138/80     Social History     Tobacco Use   Smoking Status Former Smoker     Packs/day: 0.25   Smokeless Tobacco Never Used   Tobacco Comment    quit around 2014     Statin: yes  Aspirin: no    No follow-ups on file.  There are no Patient Instructions on file for this visit.    Orders Placed This Encounter   Procedures     Ambulatory referral to Diabetes Education Program (CDE)     Referral Priority:   Routine     Referral Type:   Consultation     Referral Reason:   Evaluation and Treatment     Requested Specialty:   Endocrinology     Number of Visits Requested:   1     Medications Discontinued During This Encounter   Medication Reason     metFORMIN (GLUCOPHAGE XR) 500 MG 24 hr tablet Therapy completed       CHIEF COMPLAINT;  Chief Complaint   Patient presents with     Diabetes     HISTORY OF PRESENT ILLNESS:  Jose Armando is a 59 y.o. female presenting to the clinic today for follow up regarding diabetes. Patient's hemoglobin A1c was 7.4% on 12/17/19 while still on her Metformin. During this previous visit, she was ordered by Dr. Ogden to continue on her 10mg glipizide, but to discontinue her metformin due to the side effect of frequent loose stools. Today she reports things have been going  well. She confirms having a glucometer at home. It was reported that her blood sugars usually tend to be higher in the mornings.     REVIEW OF SYSTEMS:  All other systems are negative.    PFSH:  ASCUS with presence of HPV from last PAP smear. Follow up in 6 months.   Reviewed, as below.    TOBACCO USE:  Social History     Tobacco Use   Smoking Status Former Smoker     Packs/day: 0.25   Smokeless Tobacco Never Used   Tobacco Comment    quit around 2014       VITALS:  Vitals:    01/23/20 1002   BP: 138/80   Patient Site: Left Arm   Patient Position: Sitting   Cuff Size: Adult Large   Pulse: 93   SpO2: 99%   Weight: 195 lb 14.4 oz (88.9 kg)     Wt Readings from Last 3 Encounters:   01/23/20 195 lb 14.4 oz (88.9 kg)   12/27/19 191 lb (86.6 kg)   12/17/19 196 lb 14.4 oz (89.3 kg)     Body mass index is 30.68 kg/m .    PHYSICAL EXAM:  GENERAL APPEARANCE: Alert, cooperative, no distress, appears stated age  HEART: Regular rate and rhythm, S1 and S2 normal, no murmur, rub or gallop  EXTREMITIES: Extremities normal, atraumatic, no cyanosis or edema  FEET:  warm, without edema, no deformities, no skin lesions or skin breakdown, monofilament testing normal and pedal pulses palpable.      RECENT RESULTS  No results found for this or any previous visit (from the past 48 hour(s)).    ADDITIONAL HISTORY SUMMARIZED (2): Note from 12/27/19 with Dr. Ogden reviewed.  DECISION TO OBTAIN EXTRA INFORMATION (1): None.  RADIOLOGY TESTS (1): None.  LABS (1): None.  MEDICINE TESTS (1): None.  INDEPENDENT REVIEW (2 each): None.    The visit lasted a total of 15 minutes face to face with the patient. Over 50% of the time was spent counseling and educating the patient about diabetes management.    IBrigida, am scribing for and in the presence of, Dr. Maria.    I, Dr. Maria, personally performed the services described in this documentation, as scribed by Brigida Cazares in my presence, and it is both accurate and  complete.    MEDICATIONS:  Current Outpatient Medications   Medication Sig Dispense Refill     ACCU-CHEK FASTCLIX Misc USE FOR TESTING TWO TIMES A  each 5     acetaminophen (TYLENOL) 500 MG tablet Take 500-1,000 mg by mouth every 6 (six) hours as needed for pain.       blood glucose test (ACCU-CHEK DIANA PLUS TEST STRP) strips USE FOR TESTING TWO TIMES A  strip 2     blood-glucose meter Misc Use As Directed. accu chek diana plus       cholecalciferol, vitamin D3, 2,000 unit Tab Take 2,000 Units by mouth at bedtime.       ezetimibe (ZETIA) 10 mg tablet Take 1 tablet (10 mg total) by mouth daily. 90 tablet 3     glipiZIDE (GLUCOTROL XL) 10 MG 24 hr tablet Take 1 tablet (10 mg total) by mouth daily. 30 tablet 1     pantoprazole (PROTONIX) 20 MG tablet Take 1 tablet (20 mg total) by mouth daily. 90 tablet 3     rosuvastatin (CRESTOR) 40 MG tablet Take 1 tablet (40 mg total) by mouth at bedtime. 90 tablet 3     venlafaxine (EFFEXOR-XR) 150 MG 24 hr capsule TAKE 1 CAPSULE BY MOUTH EVERY DAY  90 capsule 3     dulaglutide 0.75 mg/0.5 mL PnIj Inject 0.75 mg under the skin every 7 days. 4 Syringe 4     No current facility-administered medications for this visit.        Total data points: 2

## 2021-06-06 NOTE — TELEPHONE ENCOUNTER
RN cannot approve Refill Request    RN can NOT refill this medication Protocol failed and NO refill given.      Daniella Guidry, [facility] Triage/Med Refill 2/27/2020    Requested Prescriptions   Pending Prescriptions Disp Refills     glipiZIDE (GLUCOTROL XL) 10 MG 24 hr tablet [Pharmacy Med Name: glipiZIDE ER Oral Tablet Extended Release 24 Hour 10 MG] 30 tablet 0     Sig: Take 1 tablet (10 mg total) by mouth daily.       Oral Hypoglycemics Refill Protocol Failed - 2/27/2020  7:01 AM        Failed - Microalbumin in last year      Microalbumin, Random Urine   Date Value Ref Range Status   02/20/2018 0.70 0.00 - 1.99 mg/dL Final                  Passed - Visit with PCP or prescribing provider visit in last 6 months       Last office visit with prescriber/PCP: 1/23/2020 OR same dept: 1/23/2020 Nivia Maria MD OR same specialty: 1/23/2020 Nivia Maria MD Last physical: 9/10/2019 Last MTM visit: Visit date not found         Next appt within 3 mo: Visit date not found  Next physical within 3 mo: Visit date not found  Prescriber OR PCP: Nivia Maria MD  Last diagnosis associated with med order: 1. Type 2 diabetes mellitus without complication, without long-term current use of insulin (H)  - glipiZIDE (GLUCOTROL XL) 10 MG 24 hr tablet [Pharmacy Med Name: glipiZIDE ER Oral Tablet Extended Release 24 Hour 10 MG]; Take 1 tablet (10 mg total) by mouth daily.  Dispense: 30 tablet; Refill: 0     If protocol passes may refill for 12 months if within 3 months of last provider visit (or a total of 15 months).           Passed - A1C in last 6 months     Hemoglobin A1c   Date Value Ref Range Status   12/17/2019 7.4 (H) 3.5 - 6.0 % Final               Passed - Blood pressure in last year     BP Readings from Last 1 Encounters:   01/23/20 138/80             Passed - Serum creatinine in last year     Creatinine   Date Value Ref Range Status   12/27/2019 0.90 0.60 - 1.10 mg/dL Final

## 2021-06-09 NOTE — PROGRESS NOTES
"Jose Armando Wilson is a 60 y.o. female who is being evaluated via a billable telephone visit.      The patient has been notified of following:     \"This telephone visit will be conducted via a call between you and your physician/provider. We have found that certain health care needs can be provided without the need for a physical exam.  This service lets us provide the care you need with a short phone conversation.  If a prescription is necessary we can send it directly to your pharmacy.  If lab work is needed we can place an order for that and you can then stop by our lab to have the test done at a later time.    Telephone visits are billed at different rates depending on your insurance coverage. During this emergency period, for some insurers they may be billed the same as an in-person visit.  Please reach out to your insurance provider with any questions.    If during the course of the call the physician/provider feels a telephone visit is not appropriate, you will not be charged for this service.\"    Patient has given verbal consent to a Telephone visit? Yes    What phone number would you like to be contacted at? [phone]    Additional provider notes:  50-year-old female with type 2 diabetes.  Last hemoglobin A1c was done in December and was 7.4.  She states she occasionally does have some higher blood sugars.  She has been taking glipizide and Trulicity.  Trulicity is been very helpful for her.  She did not tolerate metformin.  She is not having any pain into her legs.  No chest pain or breathing difficulties.  She has significant hyperlipidemia and it is been difficult to control as she has side effects with the majority of the statin medications.  She tolerates Crestor at 40 mg but her LDL is not at goal.  Zetia was added for better control.    Assessment/Plan:  1. Type 2 diabetes mellitus treated without insulin (H)  Redo for recheck of her hemoglobin A1c and lipid labs.  These labs are ordered.  She will " return for lab only visit.  I would like to see her for a face-to-face evaluation in 3 months.  To continue on her current medications with adjustments as needed pending labs.  - HM2(CBC w/o Differential); Future  - Comprehensive Metabolic Panel; Future  - Glycosylated Hemoglobin A1c; Future  - Microalbumin, Random Urine; Future    2. Dyslipidemia  - Lipid Cascade FASTING; Future  - Comprehensive Metabolic Panel; Future    3. Other fatigue  Unclear etiology for the fatigue that she has been feeling.  We will check a thyroid cascade, B12 and vitamin D levels.  Encouraged increasing activity and decreasing stress.  - Thyroid Cascade; Future  - Vitamin B12; Future  - Vitamin D, Total (25-Hydroxy); Future        Phone call duration:  8 minutes

## 2021-06-09 NOTE — TELEPHONE ENCOUNTER
RN cannot approve Refill Request    RN can NOT refill this medication PCP messaged that patient is overdue for Labs. Last office visit: Visit date not found Last Physical: Visit date not found Last MTM visit: Visit date not found Last visit same specialty: 1/23/2020 Nivia Maria MD.  Next visit within 3 mo: Visit date not found  Next physical within 3 mo: Visit date not found      Sangita De Guzman, [facility] Triage/Med Refill 6/20/2020    Requested Prescriptions   Pending Prescriptions Disp Refills     glipiZIDE (GLUCOTROL XL) 10 MG 24 hr tablet [Pharmacy Med Name: glipiZIDE ER Oral Tablet Extended Release 24 Hour 10 MG] 30 tablet 0     Sig: Take 1 tablet (10 mg total) by mouth daily.       Oral Hypoglycemics Refill Protocol Failed - 6/20/2020  7:04 AM        Failed - A1C in last 6 months     Hemoglobin A1c   Date Value Ref Range Status   12/17/2019 7.4 (H) 3.5 - 6.0 % Final               Failed - Microalbumin in last year      Microalbumin, Random Urine   Date Value Ref Range Status   02/20/2018 0.70 0.00 - 1.99 mg/dL Final                  Passed - Visit with PCP or prescribing provider visit in last 6 months       Last office visit with prescriber/PCP: Visit date not found OR same dept: 1/23/2020 Nivia Maria MD OR same specialty: 1/23/2020 Nivia Maria MD Last physical: Visit date not found Last MTM visit: Visit date not found         Next appt within 3 mo: Visit date not found  Next physical within 3 mo: Visit date not found  Prescriber OR PCP: Adilene Alcaraz CNP  Last diagnosis associated with med order: 1. Type 2 diabetes mellitus without complication, without long-term current use of insulin (H)  - glipiZIDE (GLUCOTROL XL) 10 MG 24 hr tablet [Pharmacy Med Name: glipiZIDE ER Oral Tablet Extended Release 24 Hour 10 MG]; Take 1 tablet (10 mg total) by mouth daily.   Dispense: 30 tablet; Refill: 0     If protocol passes may refill for 12 months if within 3 months of last provider  visit (or a total of 15 months).           Passed - Blood pressure in last year     BP Readings from Last 1 Encounters:   01/23/20 138/80             Passed - Serum creatinine in last year     Creatinine   Date Value Ref Range Status   12/27/2019 0.90 0.60 - 1.10 mg/dL Final

## 2021-06-10 NOTE — TELEPHONE ENCOUNTER
----- Message from Nivia Maria MD sent at 8/1/2020  9:15 AM CDT -----  Your Vitamin D level is mildly low. Increase your Vit D supplement by 1000 units. If you are not taking it regularly, take at least 2000 units daily.  Your cholesterol results are stable on your medications. Your hemoglobin A1c looks great.  Please let me know if you have any questions.

## 2021-06-10 NOTE — TELEPHONE ENCOUNTER
Refill Approved    Rx renewed per Medication Renewal Policy. Medication was last renewed on 10/19/19.    Daniella Guidry, [facility] Triage/Med Refill 8/14/2020     Requested Prescriptions   Pending Prescriptions Disp Refills     venlafaxine (EFFEXOR-XR) 150 MG 24 hr capsule [Pharmacy Med Name: VENLAFAXINE HCL  MG C 150 Capsule] 1 capsule 0     Sig: TAKE 1 CAPSULE BY MOUTH ONCE DAILY       Venlafaxine/Desvenlafaxine Refill Protocol Passed - 8/14/2020 10:28 AM        Passed - LFT or AST or ALT in last year     Albumin   Date Value Ref Range Status   07/20/2020 3.8 3.5 - 5.0 g/dL Final     Bilirubin, Total   Date Value Ref Range Status   07/20/2020 0.4 0.0 - 1.0 mg/dL Final     Bilirubin, Direct   Date Value Ref Range Status   08/19/2019 0.1 <=0.5 mg/dL Final     Alkaline Phosphatase   Date Value Ref Range Status   07/20/2020 104 45 - 120 U/L Final     AST   Date Value Ref Range Status   07/20/2020 26 0 - 40 U/L Final     ALT   Date Value Ref Range Status   07/20/2020 33 0 - 45 U/L Final     Protein, Total   Date Value Ref Range Status   07/20/2020 6.8 6.0 - 8.0 g/dL Final                Passed - Fasting lipid cascade in last year     Cholesterol   Date Value Ref Range Status   07/20/2020 234 (H) <=199 mg/dL Final     Triglycerides   Date Value Ref Range Status   07/20/2020 181 (H) <=149 mg/dL Final     HDL Cholesterol   Date Value Ref Range Status   07/20/2020 47 (L) >=50 mg/dL Final     LDL Calculated   Date Value Ref Range Status   07/20/2020 151 (H) <=129 mg/dL Final     Patient Fasting > 8hrs?   Date Value Ref Range Status   07/20/2020 Yes  Final             Passed - PCP or prescribing provider visit in last year     Last office visit with prescriber/PCP: 1/23/2020 Nivia Maria MD OR same dept: 1/23/2020 Nivia Maria MD OR same specialty: 1/23/2020 Nivia Maria MD  Last physical: 9/10/2019 Last MTM visit: Visit date not found   Next visit within 3 mo: Visit date not found  Next  physical within 3 mo: Visit date not found  Prescriber OR PCP: Nivia Maria MD  Last diagnosis associated with med order: 1. Depression  - venlafaxine (EFFEXOR-XR) 150 MG 24 hr capsule [Pharmacy Med Name: VENLAFAXINE HCL  MG C 150 Capsule]; TAKE 1 CAPSULE BY MOUTH ONCE DAILY  Dispense: 1 capsule; Refill: 0    2. Mixed hyperlipidemia  - ezetimibe (ZETIA) 10 mg tablet [Pharmacy Med Name: EZETIMIBE 10 MG TABS 10 Tablet]; TAKE 1 TABLET (10 MG) BY MOUTH ONCE DAILY  Dispense: 1 tablet; Refill: 0    If protocol passes may refill for 12 months if within 3 months of last provider visit (or a total of 15 months).             Passed - Blood Pressure in last year     BP Readings from Last 1 Encounters:   01/23/20 138/80                ezetimibe (ZETIA) 10 mg tablet [Pharmacy Med Name: EZETIMIBE 10 MG TABS 10 Tablet] 1 tablet 0     Sig: TAKE 1 TABLET (10 MG) BY MOUTH ONCE DAILY       Lovaza/Ezetimibe-Combination Refill Protocol Passed - 8/14/2020 10:28 AM        Passed - Fasting lipid cascade in last 12 months     Cholesterol   Date Value Ref Range Status   07/20/2020 234 (H) <=199 mg/dL Final     Triglycerides   Date Value Ref Range Status   07/20/2020 181 (H) <=149 mg/dL Final     HDL Cholesterol   Date Value Ref Range Status   07/20/2020 47 (L) >=50 mg/dL Final     LDL Calculated   Date Value Ref Range Status   07/20/2020 151 (H) <=129 mg/dL Final     Patient Fasting > 8hrs?   Date Value Ref Range Status   07/20/2020 Yes  Final             Passed - PCP or prescribing provider visit in past 12 months       Last office visit with prescriber/PCP: 1/23/2020 Nivia Maria MD OR same dept: 1/23/2020 Nivia Maria MD OR same specialty: 1/23/2020 Nivia Maria MD  Last physical: 9/10/2019 Last MTM visit: Visit date not found   Next visit within 3 mo: Visit date not found  Next physical within 3 mo: Visit date not found  Prescriber OR PCP: Nivia Maria MD  Last diagnosis associated with med  order: 1. Depression  - venlafaxine (EFFEXOR-XR) 150 MG 24 hr capsule [Pharmacy Med Name: VENLAFAXINE HCL  MG C 150 Capsule]; TAKE 1 CAPSULE BY MOUTH ONCE DAILY  Dispense: 1 capsule; Refill: 0    2. Mixed hyperlipidemia  - ezetimibe (ZETIA) 10 mg tablet [Pharmacy Med Name: EZETIMIBE 10 MG TABS 10 Tablet]; TAKE 1 TABLET (10 MG) BY MOUTH ONCE DAILY  Dispense: 1 tablet; Refill: 0    If protocol passes may refill for 12 months if within 3 months of last provider visit (or a total of 15 months).

## 2021-06-10 NOTE — TELEPHONE ENCOUNTER
RN cannot approve Refill Request    RN can NOT refill this medication Protocol failed and NO refill given. Last office visit: 1/23/2020 Nivia Maria MD Last Physical: 9/10/2019 Last MTM visit: Visit date not found Last visit same specialty: 1/23/2020 Nivia Maria MD.  Next visit within 3 mo: Visit date not found  Next physical within 3 mo: Visit date not found      Daniella Guidry, [facility] Connection Triage/Med Refill 8/7/2020    Requested Prescriptions   Pending Prescriptions Disp Refills     glipiZIDE (GLUCOTROL XL) 10 MG 24 hr tablet [Pharmacy Med Name: glipiZIDE ER Oral Tablet Extended Release 24 Hour 10 MG] 30 tablet 0     Sig: Take 1 tablet (10 mg total) by mouth daily.       Oral Hypoglycemics Refill Protocol Failed - 8/7/2020  1:36 PM        Failed - Visit with PCP or prescribing provider visit in last 6 months       Last office visit with prescriber/PCP: Visit date not found OR same dept: 1/23/2020 Nivia Maria MD OR same specialty: 1/23/2020 Nivia Maria MD Last physical: Visit date not found Last MTM visit: Visit date not found         Next appt within 3 mo: Visit date not found  Next physical within 3 mo: Visit date not found  Prescriber OR PCP: Nivia Maria MD  Last diagnosis associated with med order: 1. Type 2 diabetes mellitus without complication, without long-term current use of insulin (H)  - glipiZIDE (GLUCOTROL XL) 10 MG 24 hr tablet [Pharmacy Med Name: glipiZIDE ER Oral Tablet Extended Release 24 Hour 10 MG]; Take 1 tablet (10 mg total) by mouth daily.   Dispense: 30 tablet; Refill: 0     If protocol passes may refill for 12 months if within 3 months of last provider visit (or a total of 15 months).           Failed - Microalbumin in last year      Microalbumin, Random Urine   Date Value Ref Range Status   02/20/2018 0.70 0.00 - 1.99 mg/dL Final                  Passed - A1C in last 6 months     Hemoglobin A1c   Date Value Ref Range Status   07/20/2020 6.7 (H)  3.5 - 6.0 % Final               Passed - Blood pressure in last year     BP Readings from Last 1 Encounters:   01/23/20 138/80             Passed - Serum creatinine in last year     Creatinine   Date Value Ref Range Status   07/20/2020 0.79 0.60 - 1.10 mg/dL Final

## 2021-06-10 NOTE — TELEPHONE ENCOUNTER
RN cannot approve Refill Request    RN can NOT refill this medication Protocol failed and NO refill given. Last office visit: Visit date not found Last Physical: 10/23/2018 Last MTM visit: Visit date not found Last visit same specialty: 1/23/2020 Nivia Maria MD.  Next visit within 3 mo: Visit date not found  Next physical within 3 mo: Visit date not found      Daniella Guidry, [facility] Triage/Med Refill 8/14/2020    Requested Prescriptions   Pending Prescriptions Disp Refills     glipiZIDE (GLUCOTROL XL) 10 MG 24 hr tablet [Pharmacy Med Name: glipiZIDE ER 10 MG TB24 10 Tablet] 1 tablet 0     Sig: TAKE 1 TABLET (10 MG) BY MOUTH DAILY       Oral Hypoglycemics Refill Protocol Failed - 8/14/2020 10:26 AM        Failed - Visit with PCP or prescribing provider visit in last 6 months       Last office visit with prescriber/PCP: Visit date not found OR same dept: 1/23/2020 Nivia Maria MD OR same specialty: 1/23/2020 Nivia Maria MD Last physical: Visit date not found Last MTM visit: Visit date not found         Next appt within 3 mo: Visit date not found  Next physical within 3 mo: Visit date not found  Prescriber OR PCP: Leena Blue MD  Last diagnosis associated with med order: 1. Type 2 diabetes mellitus without complication, without long-term current use of insulin (H)  - glipiZIDE (GLUCOTROL XL) 10 MG 24 hr tablet [Pharmacy Med Name: glipiZIDE ER 10 MG TB24 10 Tablet]; TAKE 1 TABLET (10 MG) BY MOUTH DAILY  Dispense: 1 tablet; Refill: 0     If protocol passes may refill for 12 months if within 3 months of last provider visit (or a total of 15 months).           Failed - Microalbumin in last year      Microalbumin, Random Urine   Date Value Ref Range Status   02/20/2018 0.70 0.00 - 1.99 mg/dL Final                  Passed - A1C in last 6 months     Hemoglobin A1c   Date Value Ref Range Status   07/20/2020 6.7 (H) 3.5 - 6.0 % Final               Passed - Blood pressure in last year      BP Readings from Last 1 Encounters:   01/23/20 138/80             Passed - Serum creatinine in last year     Creatinine   Date Value Ref Range Status   07/20/2020 0.79 0.60 - 1.10 mg/dL Final

## 2021-06-12 NOTE — PROGRESS NOTES
ASSESSMENT/PLAN:  1. Routine general medical examination at a health care facility  60-year-old female.  Pap smear is completed today.  She has a history of abnormal Pap smears status post LEEP procedure.  Recommend yearly screening mammograms.  Colonoscopy will be due in 2023 within normal colonoscopy in 2013.  Continue with calcium and vitamin D supplementation.  Bone density will be due next year.    2. Abnormal cervical Papanicolaou smear, unspecified abnormal pap finding  - Gynecologic Cytology (PAP Smear)    3. Encounter for screening for malignant neoplasm of cervix   - Gynecologic Cytology (PAP Smear)    4. Mixed hyperlipidemia  Stable on Crestor and Zetia.    5. Peripheral vertigo, unspecified laterality  Chronic longstanding peripheral vertigo which is currently stable but we cannot identify a cause nor completely resolve the symptoms for her.  She does not wish for further evaluation at this time.    6. Type 2 diabetes mellitus treated without insulin (H)  Hemoglobin A1c at 6.7 so well controlled.    7. Essential hypertension, benign  Blood pressures are well controlled.    8. Gastroesophageal reflux disease without esophagitis        No follow-ups on file.  There are no Patient Instructions on file for this visit.    Orders Placed This Encounter   Procedures     Influenza, Recombinant, Inj, Quadrivalent, PF, 18+YRS     There are no discontinued medications.      CHIEF COMPLAINT:  Chief Complaint   Patient presents with     Annual Exam     Flu Vaccine       HISTORY OF PRESENT ILLNESS:  Jose Armando is a 60 y.o. female presenting to the clinic today for an annual exam.  She has been stable since we saw her last time.  We reviewed her labs from her summer visit.  She continues on her Crestor and her Zetia which she is currently tolerating.  Her dizziness is still there and stable.  She had one further episode where her leg got stuck and gave out on her which happened about a year ago when she broke her leg.  There  is the first time is happened since then so she prefer not to do any further work-up of it but just wanted to mention that it did happen.  Primarily today she states that her blood sugars have been well controlled and she is here to get a repeat Pap smear.    Remainder of 12-point ROS is negative.      Social History     Tobacco Use   Smoking Status Former Smoker     Packs/day: 0.25   Smokeless Tobacco Never Used   Tobacco Comment    quit around 2014       Family History   Problem Relation Age of Onset     Breast cancer Sister 60     Diabetes Sister      Breast cancer Cousin 55     Valvular heart disease Mother      Diabetes Brother      Diabetes Sister        Social History     Socioeconomic History     Marital status:      Spouse name: Not on file     Number of children: Not on file     Years of education: Not on file     Highest education level: Not on file   Occupational History     Not on file   Social Needs     Financial resource strain: Not on file     Food insecurity     Worry: Not on file     Inability: Not on file     Transportation needs     Medical: Not on file     Non-medical: Not on file   Tobacco Use     Smoking status: Former Smoker     Packs/day: 0.25     Smokeless tobacco: Never Used     Tobacco comment: quit around 2014   Substance and Sexual Activity     Alcohol use: Yes     Comment: rare     Drug use: No     Sexual activity: Not on file   Lifestyle     Physical activity     Days per week: Not on file     Minutes per session: Not on file     Stress: Not on file   Relationships     Social connections     Talks on phone: Not on file     Gets together: Not on file     Attends Sabianist service: Not on file     Active member of club or organization: Not on file     Attends meetings of clubs or organizations: Not on file     Relationship status: Not on file     Intimate partner violence     Fear of current or ex partner: Not on file     Emotionally abused: Not on file     Physically abused: Not  "on file     Forced sexual activity: Not on file   Other Topics Concern     Not on file   Social History Narrative     Not on file       Past Surgical History:   Procedure Laterality Date     arthroscopoy       KNEE ARTHROSCOPY W/ MENISCAL REPAIR Right 7/12/2019    Procedure: RIGHT MENISCAL REPAIR;  Surgeon: Gordo Schmid MD;  Location: [facility];  Service: Orthopedics     TIBIA FRACTURE SURGERY Right 7/12/2019    Procedure: OPEN REDUCTION INTERNAL FIXATION, FRACTURE, RIGHT TIBIAL PLATEAU;  Surgeon: Gordo Schmid MD;  Location: [facility];  Service: Orthopedics       No Known Allergies    Active Ambulatory Problems     Diagnosis Date Noted     Esophageal Reflux      Mixed hyperlipidemia      Dizziness      Vitamin D Deficiency      Cystocele      Migraine Headache      Peripheral Vertigo      Type 2 diabetes mellitus without complication (H) 12/19/2017     Dyslipidemia      Type 2 diabetes mellitus treated without insulin (H)      Essential hypertension, benign      Rectal bleed 08/20/2019     Resolved Ambulatory Problems     Diagnosis Date Noted     Diarrhea      Atypical Chest Pain      Dysthymic disorder      Tingling (Paresthesia)      Ataxic Gait      Seeing Flashing Lights (Photopsia)      Eustachian Tube Dysfunction Of Both Ears      Closed fracture of lateral portion of right tibial plateau, initial encounter 07/10/2019     Tibial plateau fracture, left, closed, initial encounter 07/10/2019     Closed fracture of tibial plateau, right, sequela      Past Medical History:   Diagnosis Date     Atypical chest pain      Diabetes mellitus (H)      Diabetes mellitus, type II (H)      Hyperglycemia      Hyperlipidemia      Hypertension      Vertigo      Vestibular migraine        VITALS:  Vitals:    11/09/20 1353   BP: 110/70   Patient Site: Left Arm   Patient Position: Sitting   Cuff Size: Adult Large   Pulse: 85   SpO2: 97%   Weight: 197 lb 11.2 oz (89.7 kg)   Height: 5' 7.13\" (1.705 m) "     Wt Readings from Last 3 Encounters:   11/09/20 197 lb 11.2 oz (89.7 kg)   02/04/20 197 lb (89.4 kg)   01/23/20 195 lb 14.4 oz (88.9 kg)     Body mass index is 30.85 kg/m .    PHYSICAL EXAM:  General Appearance: Alert, pleasant, appears stated age  Lungs: Clear to auscultation bilaterally, respirations unlabored  Breasts: No palpable masses, tenderness, asymmetry, or nipple discharge. No axillary or supraclavicular lymphadenopathy  Heart: Regular rate and rhythm, no murmur   Abdomen: Soft, non-tender, no masses, no organomegaly  Pelvic: Normally developed genitalia with no external lesions or eruptions. Vagina and cervix show no lesions. No cystocele, No rectocele. Uterus normal.  No adnexal mass or tenderness.  Extremities: Extremities with strong and symmetric pulses, no cyanosis or edema  Skin: Skin color, texture normal, no rashes or lesions  Neuro: Normal    RECENT RESULTS  No results found for this or any previous visit (from the past 48 hour(s)).    MEDICATIONS:  Current Outpatient Medications   Medication Sig Dispense Refill     ACCU-CHEK FASTCLIX Misc USE FOR TESTING TWO TIMES A  each 5     blood glucose test (ACCU-CHEK DIANA PLUS TEST STRP) strips USE FOR TESTING TWO TIMES A  strip 2     blood-glucose meter Misc Use As Directed. accu chek diana plus       dulaglutide 0.75 mg/0.5 mL PnIj Inject 0.75 mg under the skin every 7 days. 4 Syringe 4     ezetimibe (ZETIA) 10 mg tablet TAKE 1 TABLET (10 MG) BY MOUTH ONCE DAILY 90 tablet 0     glipiZIDE (GLUCOTROL XL) 10 MG 24 hr tablet TAKE 1 TABLET (10 MG) BY MOUTH DAILY 1 tablet 0     pantoprazole (PROTONIX) 20 MG tablet Take 1 tablet (20 mg total) by mouth daily. 90 tablet 3     rosuvastatin (CRESTOR) 40 MG tablet Take 1 tablet (40 mg total) by mouth at bedtime. 90 tablet 3     venlafaxine (EFFEXOR-XR) 150 MG 24 hr capsule TAKE 1 CAPSULE BY MOUTH ONCE DAILY 90 capsule 0     No current facility-administered medications for this visit.

## 2021-06-13 ENCOUNTER — HEALTH MAINTENANCE LETTER (OUTPATIENT)
Age: 61
End: 2021-06-13

## 2021-06-13 NOTE — TELEPHONE ENCOUNTER
RN cannot approve Refill Request    RN can NOT refill this medication PCP messaged that patient is overdue for Labs. Last office visit: 1/23/2020 Nivia Maria MD Last Physical: 11/9/2020 Last MTM visit: Visit date not found Last visit same specialty: 1/23/2020 Nivia Maria MD.  Next visit within 3 mo: Visit date not found  Next physical within 3 mo: Visit date not found      Sangita De Guzman, [facility] Triage/Med Refill 12/12/2020    Requested Prescriptions   Pending Prescriptions Disp Refills     glipiZIDE (GLUCOTROL XL) 10 MG 24 hr tablet [Pharmacy Med Name: glipiZIDE ER 10 MG TB24 10 Tablet] 90 tablet 0     Sig: TAKE 1 TABLET (10 MG) BY MOUTH DAILY       Oral Hypoglycemics Refill Protocol Failed - 12/11/2020  1:09 PM        Failed - Microalbumin in last year      Microalbumin, Random Urine   Date Value Ref Range Status   02/20/2018 0.70 0.00 - 1.99 mg/dL Final                  Passed - Visit with PCP or prescribing provider visit in last 6 months       Last office visit with prescriber/PCP: Visit date not found OR same dept: 1/23/2020 Nivia Maria MD OR same specialty: 1/23/2020 Nivia Maria MD Last physical: 11/9/2020 Last MTM visit: Visit date not found         Next appt within 3 mo: Visit date not found  Next physical within 3 mo: Visit date not found  Prescriber OR PCP: Nivia Maria MD  Last diagnosis associated with med order: 1. Type 2 diabetes mellitus without complication, without long-term current use of insulin (H)  - glipiZIDE (GLUCOTROL XL) 10 MG 24 hr tablet [Pharmacy Med Name: glipiZIDE ER 10 MG TB24 10 Tablet]; TAKE 1 TABLET (10 MG) BY MOUTH DAILY  Dispense: 90 tablet; Refill: 0     If protocol passes may refill for 12 months if within 3 months of last provider visit (or a total of 15 months).           Passed - A1C in last 6 months     Hemoglobin A1c   Date Value Ref Range Status   07/20/2020 6.7 (H) 3.5 - 6.0 % Final               Passed - Blood  pressure in last year     BP Readings from Last 1 Encounters:   11/09/20 110/70             Passed - Serum creatinine in last year     Creatinine   Date Value Ref Range Status   07/20/2020 0.79 0.60 - 1.10 mg/dL Final

## 2021-06-13 NOTE — TELEPHONE ENCOUNTER
Refill Approved    Rx renewed per Medication Renewal Policy. Medication was last renewed on 11/27/19, last OV 11/9/20.    Sangita De Guzman, [facility] Triage/Med Refill 11/22/2020     Requested Prescriptions   Pending Prescriptions Disp Refills     rosuvastatin (CRESTOR) 40 MG tablet [Pharmacy Med Name: ROSUVASTATIN CALCIUM 40 MG 40 Tablet] 90 tablet 0     Sig: TAKE 1 TABLET (40 MG) BY MOUTH AT BEDTIME       Statins Refill Protocol (Hmg CoA Reductase Inhibitors) Passed - 11/20/2020 12:37 PM        Passed - PCP or prescribing provider visit in past 12 months      Last office visit with prescriber/PCP: 1/23/2020 Nivia Maria MD OR same dept: 1/23/2020 Nivia Maria MD OR same specialty: 1/23/2020 Nivia Maria MD  Last physical: 11/9/2020 Last MTM visit: Visit date not found   Next visit within 3 mo: Visit date not found  Next physical within 3 mo: Visit date not found  Prescriber OR PCP: Nivia Maria MD  Last diagnosis associated with med order: 1. HLD (hyperlipidemia)  - rosuvastatin (CRESTOR) 40 MG tablet [Pharmacy Med Name: ROSUVASTATIN CALCIUM 40 MG 40 Tablet]; TAKE 1 TABLET (40 MG) BY MOUTH AT BEDTIME  Dispense: 90 tablet; Refill: 0    If protocol passes may refill for 12 months if within 3 months of last provider visit (or a total of 15 months).

## 2021-06-13 NOTE — PROGRESS NOTES
1/10/12 NIL  12/19/17 ASCUS, +HR HPV 18  2/20/18 colpo bx SUSAN I, ECC neg  2/21/19 colpo bx suspicious for HPV, ECC neg. ASCUS, neg HPV  4/4/19 LEEP SUSAN I, margins negative  12/17/19 ASCUS, +HR HPV 18  11/9/20 NIL pap, +HR HPV 18. Plan: cotest in 6 months per provider

## 2021-06-14 NOTE — PROGRESS NOTES
ASSESSMENT:  1. Esophageal reflux  She is currently taking pantoprazole but would like to wean off this and I agree.  We will replace the pantoprazole initially with Zantac 150 mg twice daily once she is stable on this will consider weaning off including outpatient altogether.  For unable to do this, we would consider a endoscopy for further evaluation as to the etiology for her significant symptoms.     2. Mixed hyperlipidemia   She has severe hyper lipidemia with LDL sometimes in the 300s.  She has failed most statin medications due to side effects but does tolerate Crestor.  We have added Zetia which is also made a difference.    - HM2(CBC w/o Differential)  - Comprehensive Metabolic Panel  - Lipid Cascade FASTING    3. Type 2 diabetes mellitus without complication  Her to continue to check her blood sugars.  She is taking an aspirin daily.  - HM2(CBC w/o Differential)  - Comprehensive Metabolic Panel    4. Dizziness  Dizziness is been ongoing.  She has had evaluation with neurology as well as ENT has gone through multiple therapies.  She is learning to live with it and find ways to avoid unwarranted fears symptoms.  She does occasionally use Zofran when she knows she will be in a situation that will stimulate this dizziness.   - Thyroid Cascade    5. Vitamin D deficiency    - Vitamin D, Total (25-Hydroxy)  - DXA Bone Density Scan; Future    6. Encounter for screening mammogram for breast cancer    - Mammo Screening Bilateral; Future    7. Health care maintenance    - Gynecologic Cytology (PAP Smear)        PLAN:  There are no Patient Instructions on file for this visit.    Orders Placed This Encounter   Procedures     DXA Bone Density Scan     Standing Status:   Future     Standing Expiration Date:   12/19/2018     Order Specific Question:   Reason for Exam (Describe Symptoms):     Answer:   screening- postmenopausa;     Order Specific Question:   Is the patient pregnant?     Answer:   No     Order Specific  Question:   Can the procedure be changed per Radiologist protocol?     Answer:   Yes     Mammo Screening Bilateral     Standing Status:   Future     Standing Expiration Date:   3/19/2019     Order Specific Question:   Patient's previous breast density:     Answer:   Scattered fibroglandular density [2]     Order Specific Question:   Is the patient pregnant?     Answer:   No     Order Specific Question:   Can the procedure be changed per Radiologist protocol?     Answer:   Yes     Td, Preservative Free (green label)     HM2(CBC w/o Differential)     Comprehensive Metabolic Panel     Lipid [place] FASTING     Order Specific Question:   Fasting is required?     Answer:   Yes     Thyroid Cascade     Vitamin D, Total (25-Hydroxy)     Medications Discontinued During This Encounter   Medication Reason     amoxicillin (AMOXIL) 500 MG tablet Therapy completed     oxyCODONE-acetaminophen (ROXICET) 5-325 mg per tablet Therapy completed     mupirocin (BACTROBAN) 2 % ointment        No Follow-up on file.    CHIEF COMPLAINT;  Chief Complaint   Patient presents with     Follow-up     cholesterol       HISTORY OF PRESENT ILLNESS:  Jose Armando is a 57 y.o. female presenting to the clinic today for a follow up regarding cholesterol and diabetes.    Hyperlipidemia: She continues on ezetimibe 10 mg daily and rosuvastatin 40 mg daily. She tolerates these medications and reports no adverse side effects. She feels the ezetimibe 10 mg is working well for her. Her last fasting lipid cascade was 12/13/16 and revealed cholesterol 234, triglycerides 109, HDL 47, and .    Diabetes: Her last A1c was 6.6 on 12/13/16. She continues on metformin 500 mg daily. She reports no adverse side effects.She states she had a rough summer, as she hurt her knee and stayed with family members on separate occasions for complications following surgery. She states that she was unable to exercise and eat healthy. She is taking her blood sugars at home, but is not  doing this regularly. She reports that she was using it a lot, but then was not able to keep up with it regularly with the stressors from this past summer.     Vertigo: She states that she still gets headaches and dizziness with bright lights and on travis, snowy days. She is using Zofran 4 mg as needed for nausea and dizziness. She reports she is not taking Zofran regularly. She states that she needs to wear sunglasses all the time, but that they cause her some problems if they are on for too long.    GERD: She continues on pantoprazole 40 mg daily. She is worried about side effects from the medication that she has read about. She has been trying to go a day without pantoprazole, but cannot go two days in a row without her symptoms returning. She has not tried reducing the dose of pantoprazole. She has not had an endoscopy previously. She states that when she lays down on her back she feels nauseous.    Health Maintenance: She has not had a DXA completed previously. She is amenable to completing one soon. She completed a Pap smear today. Her last mammogram was 9/27/16 and was normal.    REVIEW OF SYSTEMS:  She denies chest pain, shortness of breath, and lower extremity edema. She is getting back into an exercise routine. All other systems are negative.    PFSH:  Her brother-in-law had surgery in [place] with complications. Her sister had surgery with complications. Her sister had breast cancer. Reviewed as below.    TOBACCO USE:  History   Smoking Status     Former Smoker     Packs/day: 0.25   Smokeless Tobacco     Not on file       VITALS:  Vitals:    12/19/17 1038   BP: 104/78   Pulse: 64   Resp: 18   Weight: 187 lb (84.8 kg)     Wt Readings from Last 3 Encounters:   12/19/17 187 lb (84.8 kg)   12/22/16 187 lb (84.8 kg)   12/13/16 187 lb (84.8 kg)     Body mass index is 29.29 kg/(m^2).    PHYSICAL EXAM:  GENERAL APPEARANCE: Alert, cooperative, no distress, appears stated age  HEAD: Normocephalic, without obvious  abnormality, atraumatic  LUNGS: Clear to auscultation bilaterally, respirations unlabored  CHEST WALL: No tenderness or deformity  HEART: Regular rate and rhythm, S1 and S2 normal, no murmur, rub   or gallop  EXTREMITIES: Extremities normal, atraumatic, no cyanosis or edema  PULSES: 2+ and symmetric all extremities  LYMPH NODES: Cervical, supraclavicular, and axillary nodes normal  NEUROLOGIC: CNII-XII intact.     RECENT RESULTS  Recent Results (from the past 48 hour(s))   HM2(CBC w/o Differential)    Collection Time: 12/19/17 11:44 AM   Result Value Ref Range    WBC 5.3 4.0 - 11.0 thou/uL    RBC 4.82 3.80 - 5.40 mill/uL    Hemoglobin 14.6 12.0 - 16.0 g/dL    Hematocrit 43.0 35.0 - 47.0 %    MCV 89 80 - 100 fL    MCH 30.3 27.0 - 34.0 pg    MCHC 33.9 32.0 - 36.0 g/dL    RDW 13.2 11.0 - 14.5 %    Platelets 319 140 - 440 thou/uL    MPV 7.4 7.0 - 10.0 fL   Comprehensive Metabolic Panel    Collection Time: 12/19/17 11:44 AM   Result Value Ref Range    Sodium 140 136 - 145 mmol/L    Potassium 4.8 3.5 - 5.0 mmol/L    Chloride 104 98 - 107 mmol/L    CO2 23 22 - 31 mmol/L    Anion Gap, Calculation 13 5 - 18 mmol/L    Glucose 118 70 - 125 mg/dL    BUN 19 8 - 22 mg/dL    Creatinine 0.76 0.60 - 1.10 mg/dL    GFR MDRD Af Amer >60 >60 mL/min/1.73m2    GFR MDRD Non Af Amer >60 >60 mL/min/1.73m2    Bilirubin, Total 0.3 0.0 - 1.0 mg/dL    Calcium 9.6 8.5 - 10.5 mg/dL    Protein, Total 6.9 6.0 - 8.0 g/dL    Albumin 3.8 3.5 - 5.0 g/dL    Alkaline Phosphatase 82 45 - 120 U/L    AST 29 0 - 40 U/L    ALT 35 0 - 45 U/L   Lipid [place] FASTING    Collection Time: 12/19/17 11:44 AM   Result Value Ref Range    Cholesterol 204 (H) <=199 mg/dL    Triglycerides 113 <=149 mg/dL    HDL Cholesterol 48 (L) >=50 mg/dL    LDL Calculated 133 (H) <=129 mg/dL    Patient Fasting > 8hrs? Yes    Thyroid [place]    Collection Time: 12/19/17 11:44 AM   Result Value Ref Range    TSH 1.91 0.30 - 5.00 uIU/mL   Vitamin D, Total (25-Hydroxy)    Collection Time:  12/19/17 11:44 AM   Result Value Ref Range    Vitamin D, Total (25-Hydroxy) 49.4 30.0 - 80.0 ng/mL         ADDITIONAL HISTORY SUMMARIZED (2): Reviewed note, Dr. Martinez ENT, 12/22/16, advised treatment for TMD.  DECISION TO OBTAIN EXTRA INFORMATION (1): None.  RADIOLOGY TESTS (1): DXA ordered. Mammogram, future, ordered.  LABS (1): Labs ordered.  MEDICINE TESTS (1): None.  INDEPENDENT REVIEW (2 each): None.      The visit lasted a total of 24 minutes face to face with the patient. Over 50% of the time was spent counseling and educating the patient about general health maintenance, hyperlipidemia, hyperglycemia, vertigo, and GERD.    IMariluz, am scribing for and in the presence of, Dr. Maria.    I, Dr. Maria, personally performed the services described in this documentation, as scribed by Mariluz Rayo in my presence, and it is both accurate and complete.    MEDICATIONS:  Current Outpatient Prescriptions   Medication Sig Dispense Refill     ACCU-CHEK FASTCLIX Misc USE FOR TESTING TWO TIMES A  each 5     ACCU-CHEK SOFTCLIX LANCETS MISC Use As Directed 2 (two) times a day.       aspirin 81 MG EC tablet Take 81 mg by mouth daily.       blood glucose test (ACCU-CHEK MAYNOR PLUS TEST STRP) strips USE FOR TESTING TWO TIMES A  strip 3     blood-glucose meter Misc Use As Directed. accu chek maynor plus       ezetimibe (ZETIA) 10 mg tablet Take 1 tablet (10 mg total) by mouth daily. 90 tablet 0     metFORMIN (GLUCOPHAGE) 500 MG tablet TAKE ONE TABLET BY MOUTH TWICE DAILY WITH MEALS  180 tablet 0     ondansetron (ZOFRAN) 4 MG tablet Take 1 tablet (4 mg total) by mouth every 8 (eight) hours as needed for nausea. 20 tablet 2     pantoprazole (PROTONIX) 40 MG tablet TAKE ONE TABLET BY MOUTH ONE TIME DAILY 90 tablet 0     rosuvastatin (CRESTOR) 40 MG tablet Take 1 tablet (40 mg total) by mouth at bedtime. 90 tablet 0     venlafaxine (EFFEXOR-XR) 150 MG 24 hr capsule Take 1 capsule (150 mg total) by mouth  daily. 90 capsule 3     ranitidine (ZANTAC) 150 MG tablet Take 1 tablet (150 mg total) by mouth 2 (two) times a day. After 1 month, decrease to once daily 60 tablet 1     No current facility-administered medications for this visit.        Total data points: 4

## 2021-06-14 NOTE — TELEPHONE ENCOUNTER
RN cannot approve Refill Request    RN can NOT refill this medication Protocol failed and NO refill given. Last office visit: 1/23/2020 Nivia Maria MD Last Physical: 11/9/2020 Last MTM visit: Visit date not found Last visit same specialty: 1/23/2020 Nivia Maria MD.  Next visit within 3 mo: Visit date not found  Next physical within 3 mo: Visit date not found      Daniella Guidry, [facility] Triage/Med Refill 1/6/2021    Requested Prescriptions   Pending Prescriptions Disp Refills     TRULICITY 0.75 mg/0.5 mL PnIj [Pharmacy Med Name: TRULICITY 0.75 MG/0.5 ML PE 0.75 Solution Pen-injector] 2 mL 2     Sig: INJECT 0.75 MG UNDER THE SKIN EVERY 7 DAYS       Insulin/GLP-1 Refill Protocol Failed - 1/5/2021 11:49 AM        Failed - Microalbumin in last year     Microalbumin, Random Urine   Date Value Ref Range Status   02/20/2018 0.70 0.00 - 1.99 mg/dL Final                  Passed - Visit with PCP or prescribing provider visit in last 6 months     Last office visit with prescriber/PCP: Visit date not found OR same dept: 1/23/2020 Nivia Maria MD OR same specialty: 1/23/2020 Nivia Maria MD Last physical: 11/9/2020 Last MTM visit: Visit date not found     Next appt within 3 mo: Visit date not found  Next physical within 3 mo: Visit date not found  Prescriber OR PCP: Nivia Maria MD  Last diagnosis associated with med order: 1. Type 2 diabetes mellitus without complication, without long-term current use of insulin (H)  - TRULICITY 0.75 mg/0.5 mL PnIj [Pharmacy Med Name: TRULICITY 0.75 MG/0.5 ML PE 0.75 Solution Pen-injector]; INJECT 0.75 MG UNDER THE SKIN EVERY 7 DAYS  Dispense: 2 mL; Refill: 2    If protocol passes may refill for 6 months if within 3 months of last provider visit (or a total of 9 months).              Passed - A1C in last 6 months     Hemoglobin A1c   Date Value Ref Range Status   07/20/2020 6.7 (H) 3.5 - 6.0 % Final               Passed - Blood pressure in last  year     BP Readings from Last 1 Encounters:   11/09/20 110/70             Passed - Creatinine done in last year     Creatinine   Date Value Ref Range Status   07/20/2020 0.79 0.60 - 1.10 mg/dL Final

## 2021-06-16 PROBLEM — R87.610 ASCUS WITH POSITIVE HIGH RISK HPV CERVICAL: Status: ACTIVE | Noted: 2017-12-19

## 2021-06-16 PROBLEM — E11.9 TYPE 2 DIABETES MELLITUS WITHOUT COMPLICATION (H): Status: ACTIVE | Noted: 2017-12-19

## 2021-06-16 PROBLEM — K62.5 RECTAL BLEED: Status: ACTIVE | Noted: 2019-08-20

## 2021-06-16 PROBLEM — R87.810 ASCUS WITH POSITIVE HIGH RISK HPV CERVICAL: Status: ACTIVE | Noted: 2017-12-19

## 2021-06-16 NOTE — PROGRESS NOTES
Colposcopy  Date/Time: 2/20/2018 11:27 AM  Performed by: LOLA COOPER  Authorized by: LOLA COOPER   Consent: Verbal consent obtained. Written consent obtained.  Risks and benefits: risks, benefits and alternatives were discussed  Consent given by: patient  Patient understanding: patient states understanding of the procedure being performed  Patient consent: the patient's understanding of the procedure matches consent given  Procedure consent: procedure consent matches procedure scheduled  Relevant documents: relevant documents present and verified  Test results: test results available and properly labeled  Required items: required blood products, implants, devices, and special equipment available  Patient identity confirmed: verbally with patient  Preparation: Patient was prepped and draped in the usual sterile fashion.    Sedation:  Patient sedated: no  Patient tolerance: Patient tolerated the procedure well with no immediate complications  Comments: She is unsure why her Pap smear would have been abnormal and why she would have tested positive for HPV. She denies a history of abnormal Pap smears or cervical procedures. She has recently started smoking.     Small amount of acetowhite changes noted between 4-6 o'clock, confirmed those changes with Lugol's solution. Cervical biopsy taken at 5 o'clock position. Monsel's used for small amount of bleeding from cervical biopsy site  ECC done.      57-year-old female with recent abnormal Pap smear which was ASCUS and positive for HPV type XVIII.  This is her first abnormal Pap smear.  She is encouraged to stop smoking.  Biopsies were sent for pathology review.  Further recommendations pending these biopsies.  She is given instructions for self-care after including no intercourse for 4-5 days, and return with any signs of bleeding or infection.    ADDITIONAL HISTORY SUMMARIZED (2): None.  DECISION TO OBTAIN EXTRA INFORMATION (1): None.   RADIOLOGY TESTS (1):  None.  LABS (1): 12/19/17 labs reviewed, Abnormal Pap smear, HPV 18 positive. Labs ordered today.  MEDICINE TESTS (1): None.  INDEPENDENT REVIEW (2 each): None.     The visit lasted a total of 21 minutes face to face with the patient. Over 50% of the time was spent counseling and educating the patient about the colposcopy procedure.    IMariluz, am scribing for and in the presence of, Dr. Maria.    I, Dr. Maria, personally performed the services described in this documentation, as scribed by Mariluz Rayo in my presence, and it is both accurate and complete.    Total Data Points: 1

## 2021-06-19 NOTE — LETTER
Letter by Nivia Maria MD at      Author: Nivia Maria MD Service: -- Author Type: --    Filed:  Encounter Date: 8/26/2019 Status: (Other)         Jose Armando STAFFORD Steve  [address] [place] [address]      August 26, 2019      Dear Jose Armando    In reviewing your records, we have determined a gap in your preventive services. Based on your age and health history, we recommend the follow service.     ? General Physical  ? Physical with a Pap Smear  ? Colon cancer screening  ? Mammogram  ? Immunization  ? Diabetic check  ? Blood pressure/cardiovascular check  ? Asthma check  ? Cholesterol test  ? Lab work  ? Med check      If you have had the service elsewhere, please contact us so we can update our records. Please let us know if you have transferred your care to another clinic.    Please call [phone] to schedule this appointment.    We believe that a strong preventive care program, including regular physicals and follow-up care is an important part of a healthy lifestyle and we are committed to helping you maintain your health.    Thank you for choosing us as your health care provider.    Sincerely,     [facility]

## 2021-06-19 NOTE — LETTER
Letter by Nivia Maria MD at      Author: Nivia Maria MD Service: -- Author Type: --    Filed:  Encounter Date: 7/16/2019 Status: (Other)         July 16, 2019     Patient: Jose Armando Wilson   YOB: 1960   Date of Visit: 7/16/2019       To Whom It May Concern:    Jose Armando Wilson was hospitalized from 7/10-7/14/2019 at [facility].    If you have any questions or concerns, please don't hesitate to call.    Sincerely,        Electronically signed by Nivia Maria MD

## 2021-06-20 NOTE — PROGRESS NOTES
Assessment/Plan:  1. Type 2 diabetes mellitus without complication (H)  Blood work done today for diabetes.  Overall she feels like it is doing well.  - Glycosylated Hemoglobin A1c  - Comprehensive Metabolic Panel  - HM2(CBC w/o Differential)    2. Mixed hyperlipidemia  - Lipid Cascade FASTING    3. Bilateral chronic knee pain  Bilateral knee pain.  I feel like it is most likely related to an arthritis although I cannot rule out a meniscal tear particularly in the right knee.  Will order an MRI scan of the right knee.  We discussed possible injections if the pain persists.  She may need referral to orthopedics.  - XR Knees Bilateral 1 Or 2 VWS; Future  - MR Knee Without Contrast Right; Future    4. Need for vaccination  - Influenza, Seasonal,Quad Inj, 36+ MOS      Subjective:  58-year-old female with type 2 diabetes presents today with bilateral knee pain.  Her right knee hurts worse than her left.  It hurts more so in the medial aspect of her knee.  Her left knee hurts mainly when she is kneeling on it.  There is been no injuries and is been present for about 6 months.  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  She does notice some swelling by the end of the day occasionally redness.  She has had no fevers or chills.  No other joints are currently involved.    Objective:  /70  Pulse 98  Resp 18  Wt 202 lb (91.6 kg)  LMP 01/11/2012  Breastfeeding? No  BMI 31.64 kg/m2  Alert in no acute distress  Bilateral knees are normal in appearance  There is some tenderness in the right knee along the medial joint line space and some crepitus with movement of the patella full range of motion is present in bilateral knees  There is some prominence at the patellar tendon insertion of the left knee next    X-ray reveals some moderate degenerative disease and no other abnormalities

## 2021-06-20 NOTE — LETTER
Letter by Nivia Maria MD at      Author: Nivia Maria MD Service: -- Author Type: --    Filed:  Encounter Date: 12/17/2019 Status: Signed         Jose Armando Wilson  [address] [place] [address]               December 17, 2019    Dear Jose Armando:    Our records indicate that you are due for a mammogram.    In the [place], one in nine women will develop breast cancer during their lifetime. While there is no way to prevent breast cancer, early detection provides the best opportunity for curing it.    For women over the age of 40, the American Cancer Society recommends a yearly clinical breast exam and a yearly mammogram. These practices have saved thousands of lives. We need your help to ensure that you are receiving optimal medical care.    Please make an appointment for a mammogram ([phone]) at your earliest convenience.    Sincerely,    Nivia Maria MD

## 2021-06-21 NOTE — PROGRESS NOTES
Preoperative Exam    Scheduled Procedure: meniscus repair in right knee   Surgery Date:  10/25/18  Surgery Location: [facility] [address], [address] Fax# [phone]  Surgeon:  Dr Crenshaw     Assessment/Plan:     1. Pre-op exam  2. Tear of right meniscus as current injury, subsequent encounter  Pt here for preop exam for upcoming right meniscus surgery. Pt has hx diabetes on oral medications, last A1c was 7.2% on 10/2/18. Pt has no known clotting/bleeding disorders or hx anesthesia reactions but this is pt's first surgery. Based on preoperative sheet that surgeon provided for pt she does not need lab testing today as no indicated for potassium check and unlikely for procedure to result in significant blood loss - additionally pt had potassium and hemoglobin checked 3 weeks ago and both were within normal limits. EKG was done today given diabetic history and pt's age - overall normal and no change from previous EKGs. Patient is safe to proceed with planned surgery.  - Electrocardiogram Perform and Read        Surgical Procedure Risk: Intermediate (reported cardiac risk generally 1-5%)  Have you had prior anesthesia?: No  Have you or any family members had a previous anesthesia reaction:  No  Do you or any family members have a history of a clotting or bleeding disorder?: No  Cardiac Risk Assessment: no increased risk for major cardiac complications    Patient approved for surgery with general or local anesthesia.    Functional Status: Independent  Patient plans to recover at home with family.     Subjective:      Jose Armando Wilson is a 58 y.o. female who presents for a preoperative consultation.  Pt is scheduled for right meniscus surgery in 2 days - no clear injury to cause pain/discomfort, occasional mild swelling.     All other systems reviewed and are negative, other than those listed in the HPI.    Pertinent History  Do you have difficulty breathing or chest pain after walking up a flight of  stairs: No  History of obstructive sleep apnea: No  Steroid use in the last 6 months: No  Frequent Aspirin/NSAID use: Baby Aspirin normally; ortho recommended starting 325 mg aspirin 1 week prior to surgery and 4 weeks afterwards  Prior Blood Transfusion: No  Prior Blood Transfusion Reaction: No  If for some reason prior to, during or after the procedure, if it is medically indicated, would you be willing to have a blood transfusion?:  There is no transfusion refusal.    Current Outpatient Prescriptions   Medication Sig Dispense Refill     ACCU-CHEK FASTCLIX Misc USE FOR TESTING TWO TIMES A  each 5     ACCU-CHEK SOFTCLIX LANCETS MISC Use As Directed 2 (two) times a day.       aspirin 81 MG EC tablet Take 81 mg by mouth daily.       blood glucose test (ACCU-CHEK DIANA PLUS TEST STRP) strips USE FOR TESTING TWO TIMES A  strip 3     blood-glucose meter Misc Use As Directed. accu chek diana plus       ezetimibe (ZETIA) 10 mg tablet Take 1 tablet by mouth daily. 90 tablet 1     glipiZIDE (GLUCOTROL XL) 10 MG 24 hr tablet Take 1 tablet (10 mg total) by mouth daily. 30 tablet 3     ondansetron (ZOFRAN) 4 MG tablet Take 1 tablet (4 mg total) by mouth every 8 (eight) hours as needed for nausea. 20 tablet 2     pantoprazole (PROTONIX) 40 MG tablet TAKE ONE TABLET BY MOUTH ONE TIME DAILY 90 tablet 1     rosuvastatin (CRESTOR) 40 MG tablet Take 1 tablet (40 mg total) by mouth at bedtime. 90 tablet 2     venlafaxine (EFFEXOR-XR) 150 MG 24 hr capsule Take 1 capsule (150 mg total) by mouth daily. 90 capsule 3     No current facility-administered medications for this visit.       No Known Allergies    Patient Active Problem List   Diagnosis     Esophageal Reflux     Mixed hyperlipidemia     Dizziness     Vitamin D Deficiency     Cystocele     Migraine Headache     Peripheral Vertigo     Type 2 diabetes mellitus without complication (H)     Past Medical History:   Diagnosis Date     Atypical chest pain       "Hyperglycemia      Hyperlipidemia      Vertigo      Vestibular migraine      History reviewed. No pertinent surgical history.    Social History     Social History     Marital status:      Spouse name: N/A     Number of children: N/A     Years of education: N/A     Occupational History     Not on file.     Social History Main Topics     Smoking status: Former Smoker     Packs/day: 0.25     Smokeless tobacco: Never Used      Comment: quit around 2014     Alcohol use Yes      Comment: rare     Drug use: No     Sexual activity: Not on file     Other Topics Concern     Not on file     Social History Narrative     Objective:     Vitals:    10/23/18 1007   BP: 130/72   Pulse: 86   Temp: 98.2  F (36.8  C)   SpO2: 97%   Weight: 207 lb 4 oz (94 kg)   Height: 5' 7\" (1.702 m)   LMP: 01/11/2012       Physical Exam:  Physical Exam  General appearance: awake, NAD, appears stated age, obese  HEENT: atraumatic, normocephalic, PERRL, EOMI, no scleral icterus or injection, TMs normal bilaterally without erythema or effusion, no significant erythema of oropharynx, moist mucous membranes  Neck: supple, no lymphadenopathy, normal ROM, no thyromegaly or nodules noted  CV: RRR, no murmurs/rubs/gallops, normal S1 and S2  Lungs: CTAB, no wheezes or crackles, breathing comfortably on room air  Abd: active bowel sounds, soft, non-distended, non-tender  Extremities: no LE edema bilaterally, moving all extremities  Skin: no rashes or lesions  Neuro: alert, oriented x3, CNs grossly intact, no focal deficits appreciated  Psych: normal mood/affect/behavior, answering questions appropriately, linear thought process      EKG: Normal sinus rhythm with rate 73, normal axis, normal intervals, no ST or T wave changes to suggest ischemia or infarction, small Q waves in 2 3 and AVS, these are seen on prior EKGs -no significant change from prior EKGs    Labs:   10/2/2018 10:46   Sodium 138   Potassium 4.8   Chloride 104   CO2 24   Anion Gap, " Calculation 10   BUN 19   Creatinine 0.87   GFR MDRD Af Amer >60   GFR MDRD Non Af Amer >60   Calcium 9.9   AST 21   ALT 24   ALBUMIN 3.8   Protein, Total 6.9   Cholesterol 229 (H)   Alkaline Phosphatase 101   Bilirubin, Total 0.5   Triglycerides 169 (H)   HDL Cholesterol 50   LDL Calculated 145 (H)   Glucose 225 (H)   Hemoglobin A1c 7.2 (H)   WBC 5.1   RBC 4.94   Hemoglobin 15.0   Hematocrit 43.7   MCV 89   MCH 30.3   MCHC 34.3   RDW 12.9   Platelets 310       Immunization History   Administered Date(s) Administered     Influenza, Seasonal, Inj PF IIV3 01/10/2013     Influenza, inj, historic,unspecified 09/17/2015, 10/11/2017     Influenza, seasonal,quad inj 36+ mos 10/02/2018     Influenza, seasonal,quad inj 6-35 mos 11/10/2008, 10/21/2013, 10/16/2014     Influenza,seasonal, Inj IIV3 11/10/2008, 10/21/2013, 10/16/2014     Td, adult adsorbed, PF 12/19/2017     Td,adult,historic,unspecified 06/07/2006     Tdap 06/07/2006     Electronically signed by Leena Blue MD 10/23/18 10:08 AM

## 2021-06-23 NOTE — TELEPHONE ENCOUNTER
Refill Approved    Rx renewed per Medication Renewal Policy. Medication was last renewed on 5/22/18.    Daniella Guidry, [facility] Triage/Med Refill 1/11/2019     Requested Prescriptions   Pending Prescriptions Disp Refills     ezetimibe (ZETIA) 10 mg tablet [Pharmacy Med Name: Ezetimibe Oral Tablet 10 MG] 90 tablet 0     Sig: Take 1 tablet by mouth daily.    Lovaza/Ezetimibe-Combination Refill Protocol Passed - 1/10/2019 12:56 PM       Passed - Fasting lipid cascade in last 12 months    Cholesterol   Date Value Ref Range Status   10/02/2018 229 (H) <=199 mg/dL Final     Triglycerides   Date Value Ref Range Status   10/02/2018 169 (H) <=149 mg/dL Final     HDL Cholesterol   Date Value Ref Range Status   10/02/2018 50 >=50 mg/dL Final     LDL Calculated   Date Value Ref Range Status   10/02/2018 145 (H) <=129 mg/dL Final     Patient Fasting > 8hrs?   Date Value Ref Range Status   10/02/2018 No  Final            Passed - PCP or prescribing provider visit in past 12 months      Last office visit with prescriber/PCP: 10/2/2018 Nivia Maria MD OR same dept: 10/2/2018 Nivia Maria MD OR same specialty: 10/2/2018 Nivia Maria MD  Last physical: Visit date not found Last MTM visit: Visit date not found   Next visit within 3 mo: Visit date not found  Next physical within 3 mo: Visit date not found  Prescriber OR PCP: Nivia Maira MD  Last diagnosis associated with med order: 1. Mixed hyperlipidemia  - ezetimibe (ZETIA) 10 mg tablet [Pharmacy Med Name: Ezetimibe Oral Tablet 10 MG]; Take 1 tablet by mouth daily.  Dispense: 90 tablet; Refill: 0    If protocol passes may refill for 12 months if within 3 months of last provider visit (or a total of 15 months).

## 2021-06-23 NOTE — TELEPHONE ENCOUNTER
Who is calling:  Patient incorrectly self scheduled via [facility]  Reason for Call:    Patient scheduled own appointment in [facility] as an office visit for a pap smear and a colposcopy. It is unclear what the patient is wanting to be seen for, also if this is for a colposcopy to be preformed, please assist in rescheduling to the correct appointment type, thank you.  Not enough time allowed for non-clinic staff to change to the correct appointment type.  Clinic please ONLY call patient if this needs to be rescheduled.    Okay to leave a detailed message: Patient incorrectly self scheduled appointment via [facility]

## 2021-06-24 NOTE — TELEPHONE ENCOUNTER
RN cannot approve Refill Request    RN can NOT refill this medication overdue for office visits and/or labs.    Lenin Peña, [facility] Triage/Med Refill 3/5/2019    Requested Prescriptions   Pending Prescriptions Disp Refills     glipiZIDE (GLUCOTROL XL) 10 MG 24 hr tablet [Pharmacy Med Name: glipiZIDE ER Oral Tablet Extended Release 24 Hour 10 MG] 30 tablet 2     Sig: Take 1 tablet (10 mg total) by mouth daily.    Oral Hypoglycemics Refill Protocol Failed - 3/5/2019  2:03 PM       Failed - Microalbumin in last year     Microalbumin, Random Urine   Date Value Ref Range Status   02/20/2018 0.70 0.00 - 1.99 mg/dL Final                 Passed - Visit with PCP or prescribing provider visit in last 6 months      Last office visit with prescriber/PCP: 10/2/2018 OR same dept: 10/2/2018 Nivia Maria MD OR same specialty: 10/2/2018 Nivia Maria MD Last physical: Visit date not found Last MTM visit: Visit date not found         Next appt within 3 mo: Visit date not found  Next physical within 3 mo: Visit date not found  Prescriber OR PCP: Nivia Maria MD  Last diagnosis associated with med order: There are no diagnoses linked to this encounter.   If protocol passes may refill for 12 months if within 3 months of last provider visit (or a total of 15 months).          Passed - A1C in last 6 months    Hemoglobin A1c   Date Value Ref Range Status   02/21/2019 7.5 (H) 3.5 - 6.0 % Final              Passed - Blood pressure in last year    BP Readings from Last 1 Encounters:   02/21/19 140/81            Passed - Serum creatinine in last year    Creatinine   Date Value Ref Range Status   10/02/2018 0.87 0.60 - 1.10 mg/dL Final

## 2021-06-24 NOTE — PROGRESS NOTES
Colposcopy  Date/Time: 2/21/2019 10:28 AM  Performed by: Nivia Maria MD  Authorized by: Nivia Maria MD   Consent: Written consent obtained.  Risks and benefits: risks, benefits and alternatives were discussed  Consent given by: patient  Patient understanding: patient states understanding of the procedure being performed  Patient consent: the patient's understanding of the procedure matches consent given  Procedure consent: procedure consent matches procedure scheduled  Required items: required blood products, implants, devices, and special equipment available  Patient identity confirmed: verbally with patient  Preparation: Patient was prepped and draped in the usual sterile fashion.  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   Acetowhite changes noted at 12 o'clock and 7 o'clock, confirmed using Lugol's solution.  Biopsies taken at 12 o'clock and 7 o'clock positions. ECC done.  Minimal bleeding, controlled with Monsel's solution.     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 Discussed options pending pathology report including consideration of LEEP for more definitive treatment vs conservative following.  Will decide once pathology and pap smear results available for review.    Advised to avoid intercourse and use of tampons for 4-5 days.   Recommended taking ibuprofen 800 mg every 8 hours as needed for next 24-48 hours.  Encouraged to stop smoking.  Return with any signs of bleeding or infection.    ADDITIONAL HISTORY SUMMARIZED (2): None.  DECISION TO OBTAIN EXTRA INFORMATION (1): None.   RADIOLOGY TESTS (1): None.  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 PAP 12/19/17 reviewed, ASCUS, HPV18 positive. PAP ordered today.  MEDICINE TESTS (1): None.  INDEPENDENT REVIEW (2 each): None.     The visit lasted a total of 24 minutes face to face with the patient. Over 50% of the time was spent counseling and educating the patient about colposcopy procedure.    IMariluz, am scribing for and in the presence of, Dr. Maria.    I, Dr. Maria, personally performed the services described in this documentation, as scribed by Mariluz Rayo in my presence, and it is both accurate and complete.    Dragon dictation was used for this note.  Speech recognition errors are a possibility.    Total Data Points: 1

## 2021-06-26 NOTE — PROGRESS NOTES
Hi Dr Maria,    This patient has failed to schedule her 6 month follow up cotest recommended by you after NIL pap, +HR HPV 18 in November. She has gotten reminders per our pap process and would be considered lost to follow up, but we could also push her out another 6 months to repeat cotesting 1 year from previous. Please advise what follow up you recommend. Thanks!    Ellen Zurita RN BSN, Pap Tracking

## 2021-07-03 NOTE — ADDENDUM NOTE
Addendum Note by Ellen Zurita, RN at 11/9/2020  2:00 PM     Author: Ellen Zurita RN Service: -- Author Type: Registered Nurse    Filed: 11/12/2020  9:00 AM Encounter Date: 11/9/2020 Status: Signed    : Ellen Zurita RN (Registered Nurse)    Addended by: ELLEN ZRUITA on: 11/12/2020 09:00 AM        Modules accepted: Orders

## 2021-07-04 NOTE — PROGRESS NOTES
"Progress Notes by Ellen Zurita, RN at 4/26/2021  9:37 AM     Author: Ellen Zurita, RN Service: -- Author Type: Registered Nurse    Filed: 6/24/2021 11:40 AM Encounter Date: 4/26/2021 Status: Signed    : Ellen Zurita, RN (Registered Nurse)       Copied note from provider:    \"  Nivia Maria MD  You 4 minutes ago (11:35 AM)     Lets push her out to 1 year- thanks    Message text    \"       "

## 2021-07-22 ENCOUNTER — RECORDS - HEALTHEAST (OUTPATIENT)
Dept: FAMILY MEDICINE | Facility: CLINIC | Age: 61
End: 2021-07-22

## 2021-07-22 DIAGNOSIS — Z12.31 OTHER SCREENING MAMMOGRAM: ICD-10-CM

## 2021-10-03 ENCOUNTER — HEALTH MAINTENANCE LETTER (OUTPATIENT)
Age: 61
End: 2021-10-03

## 2021-10-26 ENCOUNTER — PATIENT OUTREACH (OUTPATIENT)
Dept: FAMILY MEDICINE | Facility: CLINIC | Age: 61
End: 2021-10-26
Payer: COMMERCIAL

## 2021-10-26 DIAGNOSIS — R87.610 ASCUS WITH POSITIVE HIGH RISK HPV CERVICAL: ICD-10-CM

## 2021-10-26 DIAGNOSIS — R87.810 ASCUS WITH POSITIVE HIGH RISK HPV CERVICAL: ICD-10-CM

## 2021-10-26 NOTE — LETTER
October 26, 2021      Jose Armando Wilson  [address] [place] [address]        Dear ,    This letter is to remind you that you are due for your follow-up Pap smear and Human Papillomavirus (HPV) test.    Please call [phone] to schedule your appointment at your earliest convenience.    If you have completed the appointment outside of the [facility] system, please have the records forwarded to our office. We will update your chart for your provider to review before your next annual wellness visit.     Thank you for choosing [facility]!      Sincerely,    Your [facility] Care Team

## 2021-12-23 NOTE — TELEPHONE ENCOUNTER
FYI to provider - Patient is lost to pap tracking follow-up. Attempts to contact pt have been made per reminder process and there has been no reply and/or no appt scheduled.       11/9/20 NIL pap, +HR HPV 18. Plan: cotest in 6 months per provider  11/19/20 mychart results sent / mychart read  4/26/21 Reminder mychart  5/26/21 Reminder call -- left message  6/23/21 Per provider, push reminders to 1 year  10/26/21 Reminder letter  11/24/21 Reminder call -- left message  12/23/21 Lost to follow-up for pap tracking     Ellen Zurita RN BSN, Pap Tracking

## 2022-01-23 ENCOUNTER — HEALTH MAINTENANCE LETTER (OUTPATIENT)
Age: 62
End: 2022-01-23

## 2022-07-10 ENCOUNTER — HEALTH MAINTENANCE LETTER (OUTPATIENT)
Age: 62
End: 2022-07-10

## 2022-09-10 ENCOUNTER — HEALTH MAINTENANCE LETTER (OUTPATIENT)
Age: 62
End: 2022-09-10

## 2023-04-30 ENCOUNTER — HEALTH MAINTENANCE LETTER (OUTPATIENT)
Age: 63
End: 2023-04-30

## 2023-07-23 ENCOUNTER — HEALTH MAINTENANCE LETTER (OUTPATIENT)
Age: 63
End: 2023-07-23

## 2023-12-10 ENCOUNTER — HEALTH MAINTENANCE LETTER (OUTPATIENT)
Age: 63
End: 2023-12-10

## 2024-03-02 NOTE — TELEPHONE ENCOUNTER
Request for Orders    Who s Requesting: Home Care Physical Therapist    Orders being requested: continue PT 1x/5 x 5 wks    Where to send Orders: respond via [employer].               No

## 2024-07-07 ENCOUNTER — HEALTH MAINTENANCE LETTER (OUTPATIENT)
Age: 64
End: 2024-07-07